# Patient Record
Sex: FEMALE | Race: WHITE | NOT HISPANIC OR LATINO | Employment: OTHER | URBAN - METROPOLITAN AREA
[De-identification: names, ages, dates, MRNs, and addresses within clinical notes are randomized per-mention and may not be internally consistent; named-entity substitution may affect disease eponyms.]

---

## 2017-11-02 ENCOUNTER — HOSPITAL ENCOUNTER (OUTPATIENT)
Dept: LAB | Facility: HOSPITAL | Age: 57
Discharge: HOME | End: 2017-11-02
Attending: INTERNAL MEDICINE | Admitting: INTERNAL MEDICINE

## 2017-11-02 LAB
ALBUMIN SERPL-MCNC: 4 G/DL (ref 3.5–4.8)
ALBUMIN/GLOB SERPL: 1.1 {RATIO} (ref 1–1.7)
ALP SERPL-CCNC: 60 IU/L (ref 32–91)
ALT SERPL-CCNC: 26 IU/L (ref 14–54)
ANION GAP SERPL CALC-SCNC: 9.8 MMOL/L (ref 10–20)
AST SERPL-CCNC: 28 IU/L (ref 15–41)
BASOPHILS # BLD AUTO: 0 10*3/UL (ref 0–0.2)
BASOPHILS NFR BLD AUTO: 1 % (ref 0–2)
BILIRUB SERPL-MCNC: 0.3 MG/DL (ref 0.3–1.2)
BUN SERPL-MCNC: 17 MG/DL (ref 8–20)
BUN/CREAT SERPL: 24.3 (ref 5.4–26.2)
CALCIUM SERPL-MCNC: 9.8 MG/DL (ref 8.9–10.3)
CHLORIDE SERPL-SCNC: 103 MMOL/L (ref 101–111)
CONV CO2: 26 MMOL/L (ref 22–32)
CONV TOTAL PROTEIN: 7.7 G/DL (ref 6.1–7.9)
CREAT UR-MCNC: 0.7 MG/DL (ref 0.4–1)
CRP SERPL-MCNC: <0.02 MG/DL (ref 0–0.7)
DIFFERENTIAL METHOD BLD: (no result)
EOSINOPHIL # BLD AUTO: 0.2 10*3/UL (ref 0–0.3)
EOSINOPHIL # BLD AUTO: 4 % (ref 0–3)
ERYTHROCYTE [DISTWIDTH] IN BLOOD BY AUTOMATED COUNT: 14.8 % (ref 11.5–14.5)
ERYTHROCYTE [SEDIMENTATION RATE] IN BLOOD BY WESTERGREN METHOD: 47 MM/HR (ref 0–30)
GLOBULIN UR ELPH-MCNC: 3.7 G/DL (ref 2.5–3.8)
GLUCOSE SERPL-MCNC: 97 MG/DL (ref 65–99)
HCT VFR BLD AUTO: 32.5 % (ref 35–49)
HGB BLD-MCNC: 10.9 G/DL (ref 12–15)
LYMPHOCYTES # BLD AUTO: 2.4 10*3/UL (ref 0.8–4.8)
LYMPHOCYTES NFR BLD AUTO: 41 % (ref 18–42)
MCH RBC QN AUTO: 30.7 PG (ref 26–32)
MCHC RBC AUTO-ENTMCNC: 33.4 G/DL (ref 32–36)
MCV RBC AUTO: 91.7 FL (ref 80–94)
MONOCYTES # BLD AUTO: 0.6 10*3/UL (ref 0.1–1.3)
MONOCYTES NFR BLD AUTO: 10 % (ref 2–11)
NEUTROPHILS # BLD AUTO: 2.5 10*3/UL (ref 2.3–8.6)
NEUTROPHILS NFR BLD AUTO: 44 % (ref 50–75)
NRBC BLD AUTO-RTO: 0 /100{WBCS}
NRBC/RBC NFR BLD MANUAL: 0 10*3/UL
PLATELET # BLD AUTO: 173 10*3/UL (ref 150–450)
PMV BLD AUTO: 9 FL (ref 7.4–10.4)
POTASSIUM SERPL-SCNC: 3.8 MMOL/L (ref 3.6–5.1)
RBC # BLD AUTO: 3.55 10*6/UL (ref 4–5.4)
SODIUM SERPL-SCNC: 135 MMOL/L (ref 136–144)
TSH SERPL-ACNC: 1.19 UIU/ML (ref 0.34–5.6)
WBC # BLD AUTO: 5.7 10*3/UL (ref 4.5–11.5)

## 2017-11-03 LAB
C3 SERPL-MCNC: 85 MG/DL (ref 79–152)
C4 SERPL-MCNC: 7.6 MG/DL (ref 18–55)

## 2017-11-07 LAB
ANTI-CARDIOLIPIN IGG ANTIBODY: 15 GPL
INR PPP: 1
PTT LA MIX: 37 SEC
SCREEN DRVVT: 35 SEC

## 2017-11-29 ENCOUNTER — HOSPITAL ENCOUNTER (OUTPATIENT)
Dept: LAB | Facility: HOSPITAL | Age: 57
Discharge: HOME | End: 2017-11-29
Attending: INTERNAL MEDICINE | Admitting: INTERNAL MEDICINE

## 2017-11-29 LAB — CK SERPL-CCNC: 113 IU/L (ref 38–234)

## 2018-05-30 ENCOUNTER — HOSPITAL ENCOUNTER (OUTPATIENT)
Dept: LAB | Facility: HOSPITAL | Age: 58
Discharge: HOME | End: 2018-05-30
Attending: INTERNAL MEDICINE | Admitting: INTERNAL MEDICINE

## 2018-05-30 LAB
DAT POLY-SP REAG RBC QL: NEGATIVE
IRON SATN MFR SERPL: 9 % (ref 15–50)
IRON SERPL-MCNC: 50 UG/DL (ref 28–170)
TIBC SERPL-MCNC: 532 UG/DL (ref 228–428)

## 2018-06-01 LAB — LABORATORY COMMENT REPORT: NORMAL

## 2018-06-25 ENCOUNTER — CLINICAL SUPPORT (OUTPATIENT)
Dept: ONCOLOGY | Facility: HOSPITAL | Age: 58
End: 2018-06-25

## 2018-06-25 ENCOUNTER — HOSPITAL ENCOUNTER (OUTPATIENT)
Dept: ONCOLOGY | Facility: HOSPITAL | Age: 58
Discharge: HOME | End: 2018-06-25
Attending: INTERNAL MEDICINE | Admitting: INTERNAL MEDICINE

## 2018-06-25 ENCOUNTER — HOSPITAL ENCOUNTER (OUTPATIENT)
Dept: ONCOLOGY | Facility: CLINIC | Age: 58
Setting detail: INFUSION SERIES
Discharge: HOME | End: 2018-06-25
Attending: INTERNAL MEDICINE | Admitting: INTERNAL MEDICINE

## 2018-06-25 LAB
ERYTHROCYTE [SEDIMENTATION RATE] IN BLOOD BY WESTERGREN METHOD: 49 MM/HR (ref 0–30)
IRON SATN MFR SERPL: 8 % (ref 15–50)
IRON SERPL-MCNC: 41 UG/DL (ref 28–170)
TIBC SERPL-MCNC: 512 UG/DL (ref 228–428)

## 2018-06-25 NOTE — PROGRESS NOTES
PATIENTS ONCOLOGY RECORD LOCATED IN [facility]      Subjective     Name:  LUIZA BERRY     Date:  2018  Address:  [address] [place] [address] [place] [address]  Home: [phone]  :  1960 AGE:  58 y.o.        RECORDS OBTAINED:  Patients Oncology Record is located in [facility]

## 2018-06-27 LAB — HAPTOGLOB SERPL-MCNC: 142 MG/DL (ref 36–195)

## 2018-08-23 ENCOUNTER — HOSPITAL ENCOUNTER (OUTPATIENT)
Dept: ONCOLOGY | Facility: CLINIC | Age: 58
Setting detail: INFUSION SERIES
Discharge: HOME | End: 2018-08-23
Attending: INTERNAL MEDICINE | Admitting: INTERNAL MEDICINE

## 2018-08-23 ENCOUNTER — CLINICAL SUPPORT (OUTPATIENT)
Dept: ONCOLOGY | Facility: HOSPITAL | Age: 58
End: 2018-08-23

## 2018-08-23 NOTE — PROGRESS NOTES
PATIENTS ONCOLOGY RECORD LOCATED IN [facility]      Subjective     Name:  LUIZA BERRY     Date:  2018  Address:  [address] [place] [address] [place] [address]  Home: [phone]  :  1960 AGE:  58 y.o.        RECORDS OBTAINED:  Patients Oncology Record is located in [facility]

## 2018-08-30 ENCOUNTER — CLINICAL SUPPORT (OUTPATIENT)
Dept: ONCOLOGY | Facility: HOSPITAL | Age: 58
End: 2018-08-30

## 2018-08-30 ENCOUNTER — HOSPITAL ENCOUNTER (OUTPATIENT)
Dept: ONCOLOGY | Facility: CLINIC | Age: 58
Setting detail: INFUSION SERIES
Discharge: HOME | End: 2018-08-30
Attending: INTERNAL MEDICINE | Admitting: INTERNAL MEDICINE

## 2018-08-30 NOTE — PROGRESS NOTES
PATIENTS ONCOLOGY RECORD LOCATED IN [facility]      Subjective     Name:  LUIZA BERRY     Date:  2018  Address:  [address] [place] [address] [place] [address]  Home: [phone]  :  1960 AGE:  58 y.o.        RECORDS OBTAINED:  Patients Oncology Record is located in [facility]

## 2019-07-11 ENCOUNTER — LAB (OUTPATIENT)
Dept: LAB | Facility: HOSPITAL | Age: 59
End: 2019-07-11

## 2019-07-11 ENCOUNTER — OFFICE VISIT (OUTPATIENT)
Dept: RHEUMATOLOGY | Facility: CLINIC | Age: 59
End: 2019-07-11

## 2019-07-11 VITALS
HEIGHT: 66 IN | DIASTOLIC BLOOD PRESSURE: 85 MMHG | SYSTOLIC BLOOD PRESSURE: 134 MMHG | HEART RATE: 75 BPM | BODY MASS INDEX: 27.16 KG/M2 | WEIGHT: 169 LBS

## 2019-07-11 DIAGNOSIS — R76.8 ELEVATED ANTINUCLEAR ANTIBODY (ANA) LEVEL: Primary | ICD-10-CM

## 2019-07-11 DIAGNOSIS — M79.7 PRIMARY FIBROMYALGIA SYNDROME: ICD-10-CM

## 2019-07-11 DIAGNOSIS — R76.8 ELEVATED ANTINUCLEAR ANTIBODY (ANA) LEVEL: ICD-10-CM

## 2019-07-11 DIAGNOSIS — R70.0 ESR RAISED: ICD-10-CM

## 2019-07-11 LAB
ALBUMIN SERPL-MCNC: 4 G/DL (ref 3.5–4.8)
ALBUMIN/GLOB SERPL: 0.9 G/DL (ref 1–1.7)
ALP SERPL-CCNC: 76 U/L (ref 32–91)
ALT SERPL W P-5'-P-CCNC: 29 U/L (ref 14–54)
ANION GAP SERPL CALCULATED.3IONS-SCNC: 12.7 MMOL/L (ref 5–15)
AST SERPL-CCNC: 30 U/L (ref 15–41)
BASOPHILS # BLD AUTO: 0 10*3/MM3 (ref 0–0.2)
BASOPHILS NFR BLD AUTO: 0.5 % (ref 0–1.5)
BILIRUB SERPL-MCNC: 0.5 MG/DL (ref 0.3–1.2)
BILIRUB UR QL STRIP: NEGATIVE
BUN BLD-MCNC: 12 MG/DL (ref 8–20)
BUN/CREAT SERPL: 13.3 (ref 5.4–26.2)
CALCIUM SPEC-SCNC: 9.6 MG/DL (ref 8.9–10.3)
CHLORIDE SERPL-SCNC: 105 MMOL/L (ref 101–111)
CLARITY UR: CLEAR
CO2 SERPL-SCNC: 23 MMOL/L (ref 22–32)
COLOR UR: YELLOW
CREAT BLD-MCNC: 0.9 MG/DL (ref 0.4–1)
CRP SERPL-MCNC: 0.1 MG/DL (ref 0–0.7)
DEPRECATED RDW RBC AUTO: 46.4 FL (ref 37–54)
EOSINOPHIL # BLD AUTO: 0.2 10*3/MM3 (ref 0–0.4)
EOSINOPHIL NFR BLD AUTO: 2.9 % (ref 0.3–6.2)
ERYTHROCYTE [DISTWIDTH] IN BLOOD BY AUTOMATED COUNT: 12.9 % (ref 12.3–15.4)
ERYTHROCYTE [SEDIMENTATION RATE] IN BLOOD: 53 MM/HR (ref 0–30)
GFR SERPL CREATININE-BSD FRML MDRD: 64 ML/MIN/1.73
GLOBULIN UR ELPH-MCNC: 4.5 GM/DL (ref 2.5–3.8)
GLUCOSE BLD-MCNC: 86 MG/DL (ref 65–99)
GLUCOSE UR STRIP-MCNC: NEGATIVE MG/DL
HCT VFR BLD AUTO: 36.3 % (ref 34–46.6)
HGB BLD-MCNC: 12.3 G/DL (ref 12–15.9)
HGB UR QL STRIP.AUTO: NEGATIVE
KETONES UR QL STRIP: NEGATIVE
LEUKOCYTE ESTERASE UR QL STRIP.AUTO: NEGATIVE
LYMPHOCYTES # BLD AUTO: 2.7 10*3/MM3 (ref 0.7–3.1)
LYMPHOCYTES NFR BLD AUTO: 34.5 % (ref 19.6–45.3)
MCH RBC QN AUTO: 34.7 PG (ref 26.6–33)
MCHC RBC AUTO-ENTMCNC: 34 G/DL (ref 31.5–35.7)
MCV RBC AUTO: 102 FL (ref 79–97)
MONOCYTES # BLD AUTO: 0.8 10*3/MM3 (ref 0.1–0.9)
MONOCYTES NFR BLD AUTO: 10 % (ref 5–12)
NEUTROPHILS # BLD AUTO: 4 10*3/MM3 (ref 1.7–7)
NEUTROPHILS NFR BLD AUTO: 52.1 % (ref 42.7–76)
NITRITE UR QL STRIP: NEGATIVE
PH UR STRIP.AUTO: 5.5 [PH] (ref 5–8)
PLATELET # BLD AUTO: 235 10*3/MM3 (ref 140–450)
PMV BLD AUTO: 8.6 FL (ref 6–12)
POTASSIUM BLD-SCNC: 4.7 MMOL/L (ref 3.6–5.1)
PROT SERPL-MCNC: 8.5 G/DL (ref 6.1–7.9)
PROT UR QL STRIP: NEGATIVE
RBC # BLD AUTO: 3.56 10*6/MM3 (ref 3.77–5.28)
SODIUM BLD-SCNC: 136 MMOL/L (ref 136–144)
SP GR UR STRIP: 1.01 (ref 1–1.03)
UROBILINOGEN UR QL STRIP: NORMAL
WBC NRBC COR # BLD: 7.7 10*3/MM3 (ref 3.4–10.8)

## 2019-07-11 PROCEDURE — 80053 COMPREHEN METABOLIC PANEL: CPT | Performed by: INTERNAL MEDICINE

## 2019-07-11 PROCEDURE — 81003 URINALYSIS AUTO W/O SCOPE: CPT | Performed by: INTERNAL MEDICINE

## 2019-07-11 PROCEDURE — 86140 C-REACTIVE PROTEIN: CPT | Performed by: INTERNAL MEDICINE

## 2019-07-11 PROCEDURE — 85027 COMPLETE CBC AUTOMATED: CPT | Performed by: INTERNAL MEDICINE

## 2019-07-11 PROCEDURE — 85652 RBC SED RATE AUTOMATED: CPT | Performed by: INTERNAL MEDICINE

## 2019-07-11 PROCEDURE — 99214 OFFICE O/P EST MOD 30 MIN: CPT | Performed by: INTERNAL MEDICINE

## 2019-07-11 RX ORDER — FERROUS SULFATE 325(65) MG
1 TABLET ORAL DAILY
Refills: 1 | COMMUNITY
Start: 2019-06-22

## 2019-07-11 RX ORDER — CITALOPRAM 40 MG/1
TABLET ORAL EVERY 24 HOURS
COMMUNITY
Start: 2017-11-01

## 2019-07-11 RX ORDER — NICOTINE POLACRILEX 4 MG/1
GUM, CHEWING ORAL EVERY 24 HOURS
COMMUNITY
Start: 2017-11-01

## 2019-07-11 RX ORDER — SPIRONOLACTONE 25 MG/1
25 TABLET ORAL DAILY
Refills: 1 | COMMUNITY
Start: 2019-06-08

## 2019-07-11 RX ORDER — APIXABAN 5 MG/1
5 TABLET, FILM COATED ORAL 2 TIMES DAILY
Refills: 2 | COMMUNITY
Start: 2019-06-14

## 2019-07-11 RX ORDER — GABAPENTIN 300 MG/1
300 CAPSULE ORAL DAILY
Refills: 1 | COMMUNITY
Start: 2019-05-24

## 2019-07-11 RX ORDER — METOPROLOL TARTRATE 50 MG/1
TABLET, FILM COATED ORAL
Refills: 1 | COMMUNITY
Start: 2019-06-14

## 2019-07-11 RX ORDER — MELOXICAM 15 MG/1
TABLET ORAL EVERY 24 HOURS
COMMUNITY
Start: 2017-11-01

## 2019-07-11 NOTE — PROGRESS NOTES
The patient is a 59-year-old female who comes today in follow-up.  She is known to have positive DONOVAN, low C4 without other features for connective tissue disease to have a definite diagnosis.     Since last time I saw her, she was admitted to the hospital due to sepsis secondary to Haemophilus influenza in the setting of pharyngitis/epiglottitis.  Her hospitalization was complicated with A. Fib and hypertension. Hospital course reviewed, the patient presented to [facility] 5/10/2019, the patient during the hospital course had x-rays which showed small bilateral pleural effusion adjacent to the airspace disease and it was felt that it might have been secondary to acute diastolic CHF from tachycardia.  Echocardiogram showed an ejection fraction of 65%, shortness of breath significantly improved after diuretics.       After her discharge, the patient refers feeling still weak, drained.  About two weeks after her discharge, she developed diarrhea and vomiting, claims that other family member was also suffering from similar symptoms. She also had the erythematous rash that she has had for several years intermittently for several years which she treats with a Medrol Dosepak when present, she has this treatment at least 2 times a year and it resolves completely without any other intervention.  She shows me a picture.  Denies oral or nasal ulcers, denies malar rash or excessive hair loss, no hair thinning.  Denies abdominal pain, nausea, vomiting, diarrhea constipation, no dysuria microscopic hematuria, denies skin rash at today's visit of swollen glands.  All other systems were reviewed and they were negative.    The patient is also known to suffer from fibromyalgia syndrome and takes gabapentin regularly, she feels horrible if she does not take this medication.    Laboratories were reviewed which were done 7/10/2019 which showed normal white cell count, hemoglobin, normal platelet count, normal  absolute lymphocyte count.  ESR is 52 ((she is known to have abnormal ESR), creatinine 0.64, GFR more than 60, calcium total 10.8 (10.5), CRP 0.5, UA negative for protein blood or casts    Physical exam     \  Vitals:    07/11/19 0957   BP: 134/85   Pulse: 75   General: Alert, oriented to very cooperative physical exam.  In acute distress.  Head, normocephalic, atraumatic.  Eyes: PERRLA, EOMI.  Oropharynx: Moist mucosa, no oral lesions, no ulcers.  Nose: No lesions.  Cardiac: S1, S2, regular regular rhythm, no murmur gallops or rubs.  Lungs: Clear to auscultation bilaterally.  MSK.  No major joint tenderness, no signs of synovitis, preserved range of motion.  Skin: Normal, no abnormalities noted.           Assessment:    1. patient with known positive DONOVAN, low C4, had low platelets but never below 100,000.  Today a picture of erythematous rash was seen, seems to be vasculitic in nature but really nothing definite, had never had biopsy. Always resolves with Medrol dose pack.  I reviewed laboratories done recently which were negative for thrombocytopenia, no anemia, no lymphopenia, no leukopenia.  No major complaints today except for low stamina.  Physical exam benign.  No further intervention for now.    2.  Hypercalcemia.  New to me.   Follow-up with primary care doctor.    3.  Fibromyalgia syndrome.  Follow-up with PCP    A total 30 minutes were spent with the patient.  Greater than 50% of the time was counseling the patient about the diagnosis, the treatment, coordinating care, reviewing symptoms and symptoms management.      Plan:  Review laboratories AVISE, patient to contact the office to discuss results  Follow-up with PCP regarding hypercalcemia  Continue  Management of FMS with PCP  Recommend regular exercises  Keep up-to-date with age-appropriate cancer screening  Keep up-to-date with vaccinations  RTC 6 months or sooner if needed.

## 2019-08-09 ENCOUNTER — TELEPHONE (OUTPATIENT)
Dept: RHEUMATOLOGY | Facility: CLINIC | Age: 59
End: 2019-08-09

## 2019-08-29 ENCOUNTER — TELEPHONE (OUTPATIENT)
Dept: RHEUMATOLOGY | Facility: CLINIC | Age: 59
End: 2019-08-29

## 2019-08-29 NOTE — TELEPHONE ENCOUNTER
Pt stated that she was only taking one iron pill a day and she thinks its supposed to be 2 but no record of this pt will continue with only one a day

## 2019-11-13 RX ORDER — FERROUS SULFATE 325(65) MG
TABLET ORAL
Qty: 180 TABLET | Refills: 1 | OUTPATIENT
Start: 2019-11-13

## 2019-11-14 ENCOUNTER — TELEPHONE (OUTPATIENT)
Dept: RHEUMATOLOGY | Facility: CLINIC | Age: 59
End: 2019-11-14

## 2019-11-14 DIAGNOSIS — R76.8 ELEVATED ANTINUCLEAR ANTIBODY (ANA) LEVEL: ICD-10-CM

## 2019-11-14 DIAGNOSIS — M79.7 PRIMARY FIBROMYALGIA SYNDROME: Primary | ICD-10-CM

## 2019-11-14 NOTE — TELEPHONE ENCOUNTER
Pt has been taking Iron pills 2 a day. The rx was filled by you in the past in [facility]

## 2019-11-18 NOTE — TELEPHONE ENCOUNTER
No need to renew.  Please ask her to have some basic laboratories prior to her follow-up appointment which will be in January.

## 2020-01-21 ENCOUNTER — OFFICE VISIT (OUTPATIENT)
Dept: RHEUMATOLOGY | Facility: CLINIC | Age: 60
End: 2020-01-21

## 2020-01-21 VITALS
BODY MASS INDEX: 27.97 KG/M2 | HEIGHT: 66 IN | WEIGHT: 174 LBS | SYSTOLIC BLOOD PRESSURE: 133 MMHG | HEART RATE: 57 BPM | DIASTOLIC BLOOD PRESSURE: 76 MMHG

## 2020-01-21 DIAGNOSIS — R76.8 ANA POSITIVE: Primary | ICD-10-CM

## 2020-01-21 DIAGNOSIS — M79.7 FIBROMYALGIA: ICD-10-CM

## 2020-01-21 DIAGNOSIS — E83.52 HIGH CALCIUM LEVELS: ICD-10-CM

## 2020-01-21 DIAGNOSIS — R70.0 ESR RAISED: ICD-10-CM

## 2020-01-21 PROCEDURE — 99214 OFFICE O/P EST MOD 30 MIN: CPT | Performed by: INTERNAL MEDICINE

## 2020-01-21 NOTE — PROGRESS NOTES
HPI:  The patient is a 59-year-old female who comes today in follow-up.  She is followed in the rheumatology office due to a positive DONOVAN and low C4.  So far she has not been found to have enough features to support a definite diagnosis of an autoimmune connective tissue disease.  She was seen in the office 7/11/2019, that time, the patient was noted to have hypercalcemia, she was recommended to be seen by her primary care doctor, unfortunately patient did not follow the recommendation.    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  Today the patient denies occasional joint pain is rated 1 out of 10, no morning stiffness, no joint swelling denies fever or chills, no chest pain shortness of breath, no cough, no skin lesions, all other systems reviewed and they were negative.    Laboratories done for this visit dated 1/6/2020 show a ESR of 0.5, ESR 34, total count, white cell count lymphocyte count within normal limits.  She continues to have elevated calcium at 11.3, her liver function test and kidney function is within normal limits.  AVISE CTD SLE negative    Social and family history reviewed and unchanged.    Rheumatology history:  1. DONOVAN (+):  160,  low C4, intermittent cutaneous lesions nonspecific(evaluated by Allergy an ID ).; (9/18) NO ANEMIA, NO THROMBOCYTOPENIA below [id number], NO LYMPHOPENIA; C4 low at 7; no symptoms        2. FMS      Gabapentin 300mg in the AM     3. lower extremity weakness:  EMG/NCT bilateral lower extremities normal 12/2017  Normal muscle enzymes         Past Medical History:   Diagnosis Date   • Arthritis    • Depression    • HTN (hypertension)    • Joint pain        Current Outpatient Medications   Medication Sig Dispense Refill   • citalopram (CeleXA) 40 MG tablet Daily.     • ELIQUIS 5 MG tablet tablet Take 5 mg by mouth 2 (Two) Times a Day.  2   •  ferrous sulfate 325 (65 FE) MG tablet Take 1 tablet by mouth Daily.  1   • gabapentin (NEURONTIN) 300 MG capsule Take 300 mg by mouth Daily.  1   • meloxicam (MOBIC) 15 MG tablet Daily.     • metoprolol tartrate (LOPRESSOR) 50 MG tablet TAKE 1 TABLET BY MOUTH TWICE A DAY FOR 30 DAYS  1   • Omeprazole ([employer] OMEPRAZOLE) 20 MG tablet delayed-release Daily.     • spironolactone (ALDACTONE) 25 MG tablet Take 25 mg by mouth Daily.  1     No current facility-administered medications for this visit.        Physical exam:    Vitals:    01/21/20 0815   BP: 133/76   Pulse: 57        GENERAL: Well-developed, well-nourished in no acute distress. Alert and oriented x3.  HEENT: Normocephalic, atraumatic. Pupils are equal, round, and reactive to light. Extraocular muscles are intact. Mucous membranes are pink and moist. Nostrils are clear.   NECK: Supple without lymphadenopathy.  LUNGS: Clear to auscultation bilaterally.  HEART: Regular rate and rhythm without murmur, rub or gallop.  CHEST: Respirations easy and unlabored.  EXTREMITIES: No cyanosis, edema or clubbing.  SKIN: Warm, dry and intact.  MSK: No joint tenderness, no synovitis.    Assessment:  1.  DONOVAN positive.  Unchanged.  Laboratories reviewed.  Today there are no features to support a definite diagnosis of an autoimmune connective tissue disease.    2.  Raised ESR.  Not significant at this time.  Overall her physical exam is for the most part benign except for elevated calcium.  Will monitor.    3.  Elevated calcium levels.  She was recommended again to have a follow-up with her primary care doctor regarding this issue.    4.  Fibromyalgia syndrome.  Continue to follow with PCP.  Recommend meditation and relaxation techniques.  Daily low impact exercises.    Plan:  RTC in a year, sooner if needed       Cancer Screening:    Colonscopy: 2019    PAP:2019   Mammogram:2019  Bone Health:      Calcium and Vitamin D:  YES     DEXA:  NO  Vaccines:   FLU:2019      PNUEMONIA:(13)  5/28/2019       Zoster: 1/6/20    10/4/2019   X-rays of chest, Hands, Feet: Knee and Left Foot few months ago  Hepatitis panel, HIV, QTB/PPD: NO      Orders Placed This Encounter   Procedures   • Comprehensive Metabolic Panel     Standing Status:   Future   • C-reactive Protein     Standing Status:   Future   • Sedimentation Rate     Standing Status:   Future   • C3 Complement     Standing Status:   Future     Standing Expiration Date:   1/21/2021   • C4 Complement     Standing Status:   Future     Standing Expiration Date:   1/21/2021   • CBC With Manual Differential     Standing Status:   Future

## 2020-03-18 RX ORDER — FERROUS SULFATE 325(65) MG
TABLET ORAL
Qty: 90 TABLET | Refills: 3 | OUTPATIENT
Start: 2020-03-18

## 2023-07-14 ENCOUNTER — TELEPHONE (OUTPATIENT)
Dept: ONCOLOGY | Facility: CLINIC | Age: 63
End: 2023-07-14

## 2023-07-14 NOTE — TELEPHONE ENCOUNTER
Caller: Tae Henao    Relationship: Self    Best call back number: [phone]    What is the best time to reach you: ANYTIME    Who are you requesting to speak with (clinical staff, provider, specific staff member): DR CORDOVA    What was the call regarding: PATIENT STATED THAT HER RESULTS ARE IN HER CHART CONFIRMING THAT SHE HAS MULTIPLE MYELOMA. SHE WANTED TO MOVE FORWARD WITH THE NEXT STEPS WHICH SHE THOUGHT WAS SCHEDULING A BONE MARROW BIOPSY. PLEASE CALL TO ADVISE. .

## 2023-07-24 DIAGNOSIS — D47.2 MGUS (MONOCLONAL GAMMOPATHY OF UNKNOWN SIGNIFICANCE): Primary | ICD-10-CM

## 2023-07-25 ENCOUNTER — TELEPHONE (OUTPATIENT)
Dept: ONCOLOGY | Facility: CLINIC | Age: 63
End: 2023-07-25
Payer: COMMERCIAL

## 2023-07-25 DIAGNOSIS — D47.2 MGUS (MONOCLONAL GAMMOPATHY OF UNKNOWN SIGNIFICANCE): Primary | ICD-10-CM

## 2023-07-25 DIAGNOSIS — C90.00 MULTIPLE MYELOMA NOT HAVING ACHIEVED REMISSION: Primary | ICD-10-CM

## 2023-07-25 RX ORDER — OXYCODONE HYDROCHLORIDE AND ACETAMINOPHEN 5; 325 MG/1; MG/1
1 TABLET ORAL EVERY 4 HOURS PRN
Qty: 180 TABLET | Refills: 0 | Status: SHIPPED | OUTPATIENT
Start: 2023-07-25

## 2023-07-25 RX ORDER — OXYCODONE HYDROCHLORIDE AND ACETAMINOPHEN 5; 325 MG/1; MG/1
1 TABLET ORAL EVERY 4 HOURS PRN
Qty: 180 TABLET | Refills: 0 | Status: CANCELLED | OUTPATIENT
Start: 2023-07-25

## 2023-07-25 NOTE — TELEPHONE ENCOUNTER
Caller: Juliano Luiza J    Relationship: Self    Best call back number: [phone]    What was the call regarding: LUIZA CALLED REGARDING HER BONE SCAN. SHE IS WANTING TO MAKE SURE IT WILL BE SCHEDULED ON 07/31. SHE ALSO SAYS THAT SHE IS NEEDING SOMETHING STRONGER FOR PAIN. HER TRAMADOL IS NOT HELPING. SHE HAS ALSO TRIED HYDROCODONE, BUT THAT IS NOT HELPING EITHER. PLEASE CALL TO DISCUSS.

## 2023-07-27 ENCOUNTER — DOCUMENTATION (OUTPATIENT)
Dept: ONCOLOGY | Facility: CLINIC | Age: 63
End: 2023-07-27
Payer: COMMERCIAL

## 2023-07-27 ENCOUNTER — TELEPHONE (OUTPATIENT)
Dept: ONCOLOGY | Facility: CLINIC | Age: 63
End: 2023-07-27
Payer: COMMERCIAL

## 2023-07-27 NOTE — TELEPHONE ENCOUNTER
Caller: DENISE    Relationship: [facility]    Best call back number: [phone]     Who are you requesting to speak with (clinical staff, provider,  specific staff member): PRIOR AUTHORIZATION    What was the call regarding: PRIOR AUTHORIZATION FOR PET CT HAS BEEN DENIED DUE TO RESULTS OF PREVIOUS TESTS UNCLEAR/ PREVIOUS TESTS NOT COMPLETED.    PEER TO PEER SCHEDULING PLEASE CALL #[phone]    PLEASE RETURN CALL NO LATER THAN 2PM TODAY IF APPROVED THROUGH PEER TO PEER OR IF RESCHEDULE NEEDED.

## 2023-07-27 NOTE — PROGRESS NOTES
[id number] performed with physician reviewer at [facility].  Additional information supplied included results of her skeletal survey showing lytic lesions, results of her SPEP/LATA/immunoglobulins/free light chains/U LATA/urine free light chains.  Additional information given written regards to her clinical symptoms of arthralgias and fatigue, and skin rash.  After discussion her request for PET/CT has been approved.  This will be good at [facility] from 7/25/2023 through 8/23/2023.  Authorization #[id number].

## 2023-07-28 ENCOUNTER — APPOINTMENT (OUTPATIENT)
Dept: LAB | Facility: HOSPITAL | Age: 63
End: 2023-07-28
Payer: COMMERCIAL

## 2023-07-28 ENCOUNTER — OFFICE VISIT (OUTPATIENT)
Dept: ONCOLOGY | Facility: CLINIC | Age: 63
End: 2023-07-28
Payer: COMMERCIAL

## 2023-07-28 ENCOUNTER — HOSPITAL ENCOUNTER (OUTPATIENT)
Dept: CARDIOLOGY | Facility: HOSPITAL | Age: 63
Discharge: HOME | End: 2023-07-28
Payer: COMMERCIAL

## 2023-07-28 ENCOUNTER — HOSPITAL ENCOUNTER (OUTPATIENT)
Dept: PET IMAGING | Facility: HOSPITAL | Age: 63
Discharge: HOME | End: 2023-07-28
Payer: COMMERCIAL

## 2023-07-28 VITALS
TEMPERATURE: 98.4 F | OXYGEN SATURATION: 98 % | WEIGHT: 174 LBS | RESPIRATION RATE: 18 BRPM | DIASTOLIC BLOOD PRESSURE: 78 MMHG | SYSTOLIC BLOOD PRESSURE: 143 MMHG | HEART RATE: 68 BPM | HEIGHT: 65 IN | BODY MASS INDEX: 28.99 KG/M2

## 2023-07-28 DIAGNOSIS — C90.00 MULTIPLE MYELOMA NOT HAVING ACHIEVED REMISSION: ICD-10-CM

## 2023-07-28 DIAGNOSIS — R60.0 LOCALIZED EDEMA: ICD-10-CM

## 2023-07-28 DIAGNOSIS — R76.8 ELEVATED ANTINUCLEAR ANTIBODY (ANA) LEVEL: Primary | ICD-10-CM

## 2023-07-28 DIAGNOSIS — D47.2 MGUS (MONOCLONAL GAMMOPATHY OF UNKNOWN SIGNIFICANCE): ICD-10-CM

## 2023-07-28 DIAGNOSIS — M71.22 BAKER'S CYST OF KNEE, LEFT: Primary | ICD-10-CM

## 2023-07-28 LAB
BH CV LOWER VASCULAR LEFT COMMON FEMORAL AUGMENT: NORMAL
BH CV LOWER VASCULAR LEFT COMMON FEMORAL COMPETENT: NORMAL
BH CV LOWER VASCULAR LEFT COMMON FEMORAL COMPRESS: NORMAL
BH CV LOWER VASCULAR LEFT COMMON FEMORAL PHASIC: NORMAL
BH CV LOWER VASCULAR LEFT COMMON FEMORAL SPONT: NORMAL
BH CV LOWER VASCULAR LEFT DISTAL FEMORAL COMPRESS: NORMAL
BH CV LOWER VASCULAR LEFT GASTRONEMIUS COMPRESS: NORMAL
BH CV LOWER VASCULAR LEFT GREATER SAPH AK COMPRESS: NORMAL
BH CV LOWER VASCULAR LEFT GREATER SAPH BK COMPRESS: NORMAL
BH CV LOWER VASCULAR LEFT LESSER SAPH COMPRESS: NORMAL
BH CV LOWER VASCULAR LEFT MID FEMORAL AUGMENT: NORMAL
BH CV LOWER VASCULAR LEFT MID FEMORAL COMPETENT: NORMAL
BH CV LOWER VASCULAR LEFT MID FEMORAL COMPRESS: NORMAL
BH CV LOWER VASCULAR LEFT MID FEMORAL PHASIC: NORMAL
BH CV LOWER VASCULAR LEFT MID FEMORAL SPONT: NORMAL
BH CV LOWER VASCULAR LEFT PERONEAL COMPRESS: NORMAL
BH CV LOWER VASCULAR LEFT POPLITEAL AUGMENT: NORMAL
BH CV LOWER VASCULAR LEFT POPLITEAL COMPETENT: NORMAL
BH CV LOWER VASCULAR LEFT POPLITEAL COMPRESS: NORMAL
BH CV LOWER VASCULAR LEFT POPLITEAL PHASIC: NORMAL
BH CV LOWER VASCULAR LEFT POPLITEAL SPONT: NORMAL
BH CV LOWER VASCULAR LEFT POSTERIOR TIBIAL COMPRESS: NORMAL
BH CV LOWER VASCULAR LEFT PROXIMAL FEMORAL COMPRESS: NORMAL
BH CV LOWER VASCULAR LEFT SAPHENOFEMORAL JUNCTION COMPRESS: NORMAL
BH CV LOWER VASCULAR RIGHT COMMON FEMORAL AUGMENT: NORMAL
BH CV LOWER VASCULAR RIGHT COMMON FEMORAL COMPETENT: NORMAL
BH CV LOWER VASCULAR RIGHT COMMON FEMORAL COMPRESS: NORMAL
BH CV LOWER VASCULAR RIGHT COMMON FEMORAL PHASIC: NORMAL
BH CV LOWER VASCULAR RIGHT COMMON FEMORAL SPONT: NORMAL
BH CV POP FLUID COLLECT LEFT: 1
BH CV VAS PRELIMINARY FINDINGS SCRIPTING: 1
GLUCOSE BLDC GLUCOMTR-MCNC: 99 MG/DL (ref 70–105)
HOLD SPECIMEN: NORMAL

## 2023-07-28 PROCEDURE — 93971 EXTREMITY STUDY: CPT

## 2023-07-28 PROCEDURE — 36415 COLL VENOUS BLD VENIPUNCTURE: CPT

## 2023-07-28 PROCEDURE — 82948 REAGENT STRIP/BLOOD GLUCOSE: CPT

## 2023-07-28 PROCEDURE — 0 FLUDEOXYGLUCOSE F18 SOLUTION: Performed by: INTERNAL MEDICINE

## 2023-07-28 PROCEDURE — 78815 PET IMAGE W/CT SKULL-THIGH: CPT

## 2023-07-28 PROCEDURE — A9552 F18 FDG: HCPCS | Performed by: INTERNAL MEDICINE

## 2023-07-28 RX ADMIN — FLUDEOXYGLUCOSE F18 1 DOSE: 300 INJECTION INTRAVENOUS at 09:40

## 2023-07-28 NOTE — PROGRESS NOTES
HEMATOLOGY ONCOLOGY OUTPATIENT CONSULTATION       Patient name: Tae Henao  : 1960  MRN: [id number]  Primary Care Physician: Reilly Ruano APRN  Referring Physician: Reilly Ruano APRN  Reason For Consult:       History of Present Illness:  Patient is a 63 y.o. 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    Patient has had recurrent urinary tract infections since 2023.  Patient has had cystoscopy which was unremarkable  She also complains of rash along with flulike symptoms since [facility].  Rash is predominantly on her legs and was on her face look like urticaria.  She has a photo of the rash which has now improved significantly.  She also been having pain in her chest back.  She was referred to dermatology with this rashes and her DONOVAN was positive  She was prescribed prednisone and the rash improved.  She had a punch biopsy of the rash which showed mild epidermal spongiosis with perivascular interstitial lymphohistiocytic infiltrate  As a part of the work-up she had a SPEP LATA which showed monoclonal gammopathy was referred for plasma cell dyscrasia  Was discussed by her dermatologist at certain rashes can be seen with monoclonal gammopathy such as Schnitzler syndrome    Subjective:  Julieta is here today for an acute visit due to new onset left lower extremity pain and swelling.  She is accompanied at the visit by her cousin.  She reports that she has had swelling in her left leg that feels tight and painful.  There has been no known injury.  At her baseline she does have widespread bone pain that causes her difficulty with ambulating and she is using a wheelchair at the appointment today.      Past Medical History:   Diagnosis Date    Arthritis     Depression     HTN (hypertension)     Joint pain        Past Surgical History:   Procedure Laterality Date     SECTION      GASTRIC BYPASS           Current Outpatient Medications:     aspirin 81 MG EC  tablet, Take 1 tablet by mouth Daily., Disp: , Rfl:     citalopram (CeleXA) 40 MG tablet, Daily., Disp: , Rfl:     ferrous sulfate 325 (65 FE) MG tablet, Take 1 tablet by mouth Daily., Disp: , Rfl: 1    gabapentin (NEURONTIN) 300 MG capsule, Take 1 capsule by mouth Daily., Disp: , Rfl: 1    meloxicam (MOBIC) 15 MG tablet, Daily., Disp: , Rfl:     Methenamine-Sodium Salicylate (CYSTEX PO), Take  by mouth., Disp: , Rfl:     multivitamin with minerals tablet tablet, Take 1 tablet by mouth Daily., Disp: , Rfl:     Omeprazole 20 MG tablet delayed-release, Daily., Disp: , Rfl:     oxyCODONE-acetaminophen (PERCOCET) 5-325 MG per tablet, Take 1 tablet by mouth Every 4 (Four) Hours As Needed for Moderate Pain., Disp: 180 tablet, Rfl: 0    Probiotic Product (FORTIFY DAILY PROBIOTIC PO), Take  by mouth., Disp: , Rfl:     spironolactone (ALDACTONE) 25 MG tablet, Take 1 tablet by mouth Daily., Disp: , Rfl: 1    traMADol (ULTRAM) 50 MG tablet, Take 1 tablet by mouth Every Night., Disp: 30 tablet, Rfl: 0    metoprolol tartrate (LOPRESSOR) 50 MG tablet, TAKE 1 TABLET BY MOUTH TWICE A DAY FOR 30 DAYS (Patient not taking: Reported on 7/28/2023), Disp: , Rfl: 1  No current facility-administered medications for this visit.    Allergies   Allergen Reactions    Morphine Unknown (See Comments)    Azithromycin Unknown (See Comments)    Penicillins Unknown (See Comments)       Family History   Problem Relation Age of Onset    Arthritis Mother     Diabetes Mother     Hypertension Mother     Hypertension Father     Heart disease Father     Arthritis Father     Cancer Sister     Stroke Sister        Cancer-related family history includes Cancer in her sister.      Social History     Tobacco Use    Smoking status: Former     Types: Cigarettes    Smokeless tobacco: Never   Substance Use Topics    Alcohol use: Yes    Drug use: No     Social History     Social History Narrative    Not on file       ROS:   Review of Systems   Constitutional:   "Positive for fatigue. Negative for fever.   HENT:  Negative for congestion and nosebleeds.    Eyes:  Negative for pain.   Respiratory:  Negative for cough and shortness of breath.    Cardiovascular:  Negative for chest pain.   Gastrointestinal:  Negative for abdominal pain, blood in stool, diarrhea, nausea and vomiting.   Endocrine: Negative for cold intolerance and heat intolerance.   Genitourinary:  Negative for difficulty urinating.   Musculoskeletal:  Positive for arthralgias and myalgias.   Skin:  Negative for rash.   Neurological:  Positive for weakness. Negative for dizziness and headaches.   Hematological:  Does not bruise/bleed easily.   Psychiatric/Behavioral:  Negative for behavioral problems.        Objective:    Vital Signs:  Vitals:    07/28/23 1120   BP: 143/78   Pulse: 68   Resp: 18   Temp: 98.4 °F (36.9 °C)   SpO2: 98%   Weight: 78.9 kg (174 lb)   Height: 163.8 cm (64.5\")   PainSc:   6   PainLoc: Generalized       Body mass index is 29.41 kg/m².    ECOG  (0) Fully active, able to carry on all predisease performance without restriction    Physical Exam:   Physical Exam  Constitutional:       Appearance: Normal appearance.   HENT:      Head: Normocephalic and atraumatic.   Eyes:      Pupils: Pupils are equal, round, and reactive to light.   Cardiovascular:      Rate and Rhythm: Normal rate and regular rhythm.      Pulses: Normal pulses.      Heart sounds: No murmur heard.  Pulmonary:      Effort: Pulmonary effort is normal.      Breath sounds: Normal breath sounds.   Abdominal:      General: There is no distension.      Palpations: Abdomen is soft. There is no mass.      Tenderness: There is no abdominal tenderness.   Musculoskeletal:         General: Normal range of motion.      Cervical back: Normal range of motion.      Left lower leg: Edema (Nonpitting) present.      Comments: Nonpitting edema to the left lower extremity from foot to below the knee.  No redness.   Skin:     General: Skin is warm. "   Neurological:      General: No focal deficit present.      Mental Status: She is alert.   Psychiatric:         Mood and Affect: Mood normal.       Lab Results - Last 18 Months   Lab Units 07/28/23  1113 07/11/23  1439   WBC 10*3/mm3 6.93 8.79   HEMOGLOBIN g/dL 11.2* 12.0   HEMATOCRIT % 35.0 38.0   PLATELETS 10*3/mm3 235 270   MCV fL 101.7* 104.7*     Lab Results - Last 18 Months   Lab Units 07/11/23  1439   SODIUM mmol/L 133*   POTASSIUM mmol/L 4.9   CHLORIDE mmol/L 100   CO2 mmol/L 26.0   BUN mg/dL 13   CREATININE mg/dL 0.78   CALCIUM mg/dL 9.9   BILIRUBIN mg/dL 0.3   ALK PHOS U/L 120*   ALT (SGPT) U/L 27   AST (SGOT) U/L 25   GLUCOSE mg/dL 83       Lab Results   Component Value Date    GLUCOSE 83 07/11/2023    BUN 13 07/11/2023    CREATININE 0.78 07/11/2023    EGFRIFNONA 64 07/11/2019    BCR 16.7 07/11/2023    K 4.9 07/11/2023    CO2 26.0 07/11/2023    CALCIUM 9.9 07/11/2023    PROTENTOTREF 8.3 07/11/2023    ALBUMIN 4.3 07/11/2023    ALBUMIN 3.8 07/11/2023    LABIL2 0.9 07/11/2023    AST 25 07/11/2023    ALT 27 07/11/2023       No results for input(s): APTT, INR, PTT in the last 72719 hours.    Lab Results   Component Value Date    IRON 41 06/25/2018    TIBC 512 (H) 06/25/2018       No results found for: FOLATE    No results found for: OCCULTBLD    No results found for: RETICCTPCT  No results found for: WRCGMDZP84  No results found for: SPEP, UPEP  No results found for: LDH, URICACID  Lab Results   Component Value Date    SEDRATE 53 (H) 07/11/2019     Lab Results   Component Value Date    HAPTOGLOBIN 142 06/25/2018     Lab Results   Component Value Date    INR 1.0 11/02/2017     No results found for:   No results found for: CEA  No components found for: CA-19-9  No results found for: PSA  Lab Results   Component Value Date    SEDRATE 53 (H) 07/11/2019          Assessment & Plan     Patient is a 63-year-old female with intermittent urticarial rash, arthralgias findings concerning for monoclonal gammopathy  versus myeloma    Monoclonal gammopathy  SPEP with IgG 2768, M spike at 2.0 IgG monoclonal protein with lambda specificity  Free light chain kappa 13.4, free light chain lambda at 199.9 with kappa lambda ratio of 0.07  Given such a significant elevation of monoclonal protein there is definitely underlying plasma cell dyscrasia  Skeletal survey with some lytic lesion seen will get bone scan and PET/CT  We will also get bone marrow biopsy to look at the plasma cell concentration in the bone marrow  If she satisfies myeloma defining event we will need to start treatment for multiple myeloma currently no criteria satisfied given normal creatinine, calcium, hemoglobin    I will see her back once above work-up is completed.    Left lower extremity pain and swelling  Discussed the need to rule out DVT  Possible provoking factor of decreased mobility due to her arthralgias also possible underlying malignancy as above  Could also be swelling related to musculoskeletal issues as her Mobic has been on hold for her upcoming bone marrow biopsy  Send for stat venous Doppler ultrasound of the left lower extremity.  Patient to be held and results called.  If positive will need anticoagulation and patient will be brought back into the office to discuss posttesting.  Discussed continued use of Percocet and the amount of acetaminophen that can be used as needed to help supplement her pain medication while her Mobic is on hold.      Thank you very much for providing the opportunity to participate in this patient’s care. Please do not hesitate to call if there are any other questions.  Time spent on encounter including record review, history taking, exam, discussion, counseling and documentation at: 60 minutes

## 2023-07-31 ENCOUNTER — HOSPITAL ENCOUNTER (OUTPATIENT)
Dept: CT IMAGING | Facility: HOSPITAL | Age: 63
Discharge: HOME | End: 2023-07-31
Admitting: RADIOLOGY
Payer: COMMERCIAL

## 2023-07-31 VITALS
RESPIRATION RATE: 16 BRPM | TEMPERATURE: 98.2 F | DIASTOLIC BLOOD PRESSURE: 56 MMHG | WEIGHT: 174 LBS | SYSTOLIC BLOOD PRESSURE: 116 MMHG | BODY MASS INDEX: 29.71 KG/M2 | HEART RATE: 83 BPM | OXYGEN SATURATION: 98 % | HEIGHT: 64 IN

## 2023-07-31 DIAGNOSIS — D47.2 MGUS (MONOCLONAL GAMMOPATHY OF UNKNOWN SIGNIFICANCE): ICD-10-CM

## 2023-07-31 LAB
APTT PPP: 28.8 SECONDS (ref 24–31)
BASOPHILS # BLD AUTO: 0 10*3/MM3 (ref 0–0.2)
BASOPHILS NFR BLD AUTO: 0.6 % (ref 0–1.5)
DEPRECATED RDW RBC AUTO: 46.8 FL (ref 37–54)
EOSINOPHIL # BLD AUTO: 0.1 10*3/MM3 (ref 0–0.4)
EOSINOPHIL NFR BLD AUTO: 1.3 % (ref 0.3–6.2)
ERYTHROCYTE [DISTWIDTH] IN BLOOD BY AUTOMATED COUNT: 12.8 % (ref 12.3–15.4)
HCT VFR BLD AUTO: 35.3 % (ref 34–46.6)
HGB BLD-MCNC: 11.5 G/DL (ref 12–15.9)
INR PPP: 1 (ref 0.93–1.1)
LYMPHOCYTES # BLD AUTO: 2.4 10*3/MM3 (ref 0.7–3.1)
LYMPHOCYTES NFR BLD AUTO: 31.1 % (ref 19.6–45.3)
MCH RBC QN AUTO: 31.9 PG (ref 26.6–33)
MCHC RBC AUTO-ENTMCNC: 32.5 G/DL (ref 31.5–35.7)
MCV RBC AUTO: 98.2 FL (ref 79–97)
MONOCYTES # BLD AUTO: 0.6 10*3/MM3 (ref 0.1–0.9)
MONOCYTES NFR BLD AUTO: 7.8 % (ref 5–12)
NEUTROPHILS NFR BLD AUTO: 4.5 10*3/MM3 (ref 1.7–7)
NEUTROPHILS NFR BLD AUTO: 59.2 % (ref 42.7–76)
NRBC BLD AUTO-RTO: 0.1 /100 WBC (ref 0–0.2)
PLATELET # BLD AUTO: 283 10*3/MM3 (ref 140–450)
PMV BLD AUTO: 7.5 FL (ref 6–12)
PROTHROMBIN TIME: 10.7 SECONDS (ref 9.6–11.7)
RBC # BLD AUTO: 3.59 10*6/MM3 (ref 3.77–5.28)
WBC NRBC COR # BLD: 7.6 10*3/MM3 (ref 3.4–10.8)

## 2023-07-31 PROCEDURE — 85730 THROMBOPLASTIN TIME PARTIAL: CPT | Performed by: RADIOLOGY

## 2023-07-31 PROCEDURE — 77012 CT SCAN FOR NEEDLE BIOPSY: CPT

## 2023-07-31 PROCEDURE — 0 LIDOCAINE 1 % SOLUTION: Performed by: RADIOLOGY

## 2023-07-31 PROCEDURE — 85025 COMPLETE CBC W/AUTO DIFF WBC: CPT | Performed by: RADIOLOGY

## 2023-07-31 PROCEDURE — 25010000002 FENTANYL CITRATE (PF) 50 MCG/ML SOLUTION: Performed by: RADIOLOGY

## 2023-07-31 PROCEDURE — 85610 PROTHROMBIN TIME: CPT | Performed by: RADIOLOGY

## 2023-07-31 PROCEDURE — 99152 MOD SED SAME PHYS/QHP 5/>YRS: CPT

## 2023-07-31 PROCEDURE — 25010000002 MIDAZOLAM PER 1 MG: Performed by: RADIOLOGY

## 2023-07-31 PROCEDURE — 25010000002 ONDANSETRON PER 1 MG: Performed by: RADIOLOGY

## 2023-07-31 RX ORDER — SODIUM CHLORIDE 0.9 % (FLUSH) 0.9 %
10 SYRINGE (ML) INJECTION EVERY 12 HOURS SCHEDULED
Status: DISCONTINUED | OUTPATIENT
Start: 2023-07-31 | End: 2023-08-01 | Stop reason: HOSPADM

## 2023-07-31 RX ORDER — SODIUM CHLORIDE 0.9 % (FLUSH) 0.9 %
10 SYRINGE (ML) INJECTION AS NEEDED
Status: DISCONTINUED | OUTPATIENT
Start: 2023-07-31 | End: 2023-08-01 | Stop reason: HOSPADM

## 2023-07-31 RX ORDER — ONDANSETRON 2 MG/ML
INJECTION INTRAMUSCULAR; INTRAVENOUS AS NEEDED
Status: COMPLETED | OUTPATIENT
Start: 2023-07-31 | End: 2023-07-31

## 2023-07-31 RX ORDER — MIDAZOLAM HYDROCHLORIDE 1 MG/ML
INJECTION INTRAMUSCULAR; INTRAVENOUS AS NEEDED
Status: COMPLETED | OUTPATIENT
Start: 2023-07-31 | End: 2023-07-31

## 2023-07-31 RX ORDER — LIDOCAINE HYDROCHLORIDE 10 MG/ML
INJECTION, SOLUTION INFILTRATION; PERINEURAL AS NEEDED
Status: COMPLETED | OUTPATIENT
Start: 2023-07-31 | End: 2023-07-31

## 2023-07-31 RX ORDER — SODIUM CHLORIDE 9 MG/ML
75 INJECTION, SOLUTION INTRAVENOUS CONTINUOUS
Status: DISCONTINUED | OUTPATIENT
Start: 2023-07-31 | End: 2023-08-01 | Stop reason: HOSPADM

## 2023-07-31 RX ORDER — FENTANYL CITRATE 50 UG/ML
INJECTION, SOLUTION INTRAMUSCULAR; INTRAVENOUS AS NEEDED
Status: COMPLETED | OUTPATIENT
Start: 2023-07-31 | End: 2023-07-31

## 2023-07-31 RX ADMIN — ONDANSETRON 4 MG: 2 INJECTION INTRAMUSCULAR; INTRAVENOUS at 13:46

## 2023-07-31 RX ADMIN — Medication 10 ML: at 12:58

## 2023-07-31 RX ADMIN — SODIUM CHLORIDE 75 ML/HR: 9 INJECTION, SOLUTION INTRAVENOUS at 12:58

## 2023-07-31 RX ADMIN — LIDOCAINE HYDROCHLORIDE 10 ML: 10 INJECTION, SOLUTION INFILTRATION; PERINEURAL at 13:59

## 2023-07-31 RX ADMIN — MIDAZOLAM 1 MG: 1 INJECTION INTRAMUSCULAR; INTRAVENOUS at 13:58

## 2023-07-31 RX ADMIN — FENTANYL CITRATE 50 MCG: 50 INJECTION, SOLUTION INTRAMUSCULAR; INTRAVENOUS at 13:52

## 2023-07-31 RX ADMIN — MIDAZOLAM 1 MG: 1 INJECTION INTRAMUSCULAR; INTRAVENOUS at 13:54

## 2023-07-31 RX ADMIN — FENTANYL CITRATE 50 MCG: 50 INJECTION, SOLUTION INTRAMUSCULAR; INTRAVENOUS at 13:48

## 2023-08-01 LAB — Lab: NORMAL

## 2023-08-03 ENCOUNTER — OFFICE VISIT (OUTPATIENT)
Dept: ORTHOPEDIC SURGERY | Facility: CLINIC | Age: 63
End: 2023-08-03
Payer: COMMERCIAL

## 2023-08-03 VITALS — HEIGHT: 64 IN | BODY MASS INDEX: 29.71 KG/M2 | WEIGHT: 174 LBS | OXYGEN SATURATION: 100 % | HEART RATE: 63 BPM

## 2023-08-03 DIAGNOSIS — R76.8 ELEVATED ANTINUCLEAR ANTIBODY (ANA) LEVEL: ICD-10-CM

## 2023-08-03 DIAGNOSIS — M71.22 BAKER'S CYST OF KNEE, LEFT: ICD-10-CM

## 2023-08-03 DIAGNOSIS — G89.29 CHRONIC PAIN OF BOTH KNEES: Primary | ICD-10-CM

## 2023-08-03 DIAGNOSIS — M25.562 CHRONIC PAIN OF BOTH KNEES: Primary | ICD-10-CM

## 2023-08-03 DIAGNOSIS — D47.2 MONOCLONAL GAMMOPATHY: ICD-10-CM

## 2023-08-03 DIAGNOSIS — M25.561 CHRONIC PAIN OF BOTH KNEES: Primary | ICD-10-CM

## 2023-08-03 DIAGNOSIS — M17.0 BILATERAL PRIMARY OSTEOARTHRITIS OF KNEE: ICD-10-CM

## 2023-08-03 RX ADMIN — TRIAMCINOLONE ACETONIDE 40 MG: 40 INJECTION, SUSPENSION INTRA-ARTICULAR; INTRAMUSCULAR at 13:53

## 2023-08-03 RX ADMIN — LIDOCAINE HYDROCHLORIDE 4 ML: 10 INJECTION, SOLUTION EPIDURAL; INFILTRATION; INTRACAUDAL; PERINEURAL at 13:53

## 2023-08-04 RX ORDER — TRIAMCINOLONE ACETONIDE 40 MG/ML
40 INJECTION, SUSPENSION INTRA-ARTICULAR; INTRAMUSCULAR
Status: COMPLETED | OUTPATIENT
Start: 2023-08-03 | End: 2023-08-03

## 2023-08-04 RX ORDER — LIDOCAINE HYDROCHLORIDE 10 MG/ML
4 INJECTION, SOLUTION EPIDURAL; INFILTRATION; INTRACAUDAL; PERINEURAL
Status: COMPLETED | OUTPATIENT
Start: 2023-08-03 | End: 2023-08-03

## 2023-08-04 NOTE — PROGRESS NOTES
HEMATOLOGY ONCOLOGY OUTPATIENT FOLLOW UP      Patient name: Tae Henao  : 1960  MRN: [id number]  Primary Care Physician: Reilly Ruano APRN  Referring Physician: Reilly Ruano APRN  Reason For Consult:       History of Present Illness:  Patient is a 63 y.o. 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    Patient has had recurrent urinary tract infections since 2023.  Patient has had cystoscopy which was unremarkable  She also complains of rash along with flulike symptoms since Memorial Day.  Rash is predominantly on her legs and was on her face look like urticaria.  She has a photo of the rash which has now improved significantly.  She also been having pain in her chest back.  She was referred to dermatology with this rashes and her DONOVAN was positive  She was prescribed prednisone and the rash improved.  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  Kappa lambda ratio in the urine 0.56  PET scan with no evidence of malignancy    Subjective:  Patient is here for follow-up after bone marrow biopsy symptomatic improvement with her pain no other new symptoms      Past Medical History:   Diagnosis Date    Arthritis     Depression     HTN (hypertension)     Joint pain        Past Surgical History:   Procedure Laterality Date      SECTION  1986    GASTRIC BYPASS  2006         Current Outpatient Medications:     aspirin 81 MG EC tablet, Take 1 tablet by mouth Daily., Disp: , Rfl:     citalopram (CeleXA) 40 MG tablet, Daily., Disp: , Rfl:     ferrous sulfate 325 (65 FE) MG tablet, Take 1 tablet by mouth Daily., Disp: , Rfl: 1    gabapentin (NEURONTIN) 300 MG capsule, Take 1 capsule by mouth Daily., Disp: , Rfl: 1    meloxicam (MOBIC) 15 MG tablet, Daily., Disp: , Rfl:     Methenamine-Sodium Salicylate (CYSTEX PO), Take  by mouth. (Patient not taking: Reported on 8/3/2023), Disp: , Rfl:     metoprolol tartrate (LOPRESSOR) 50 MG tablet, , Disp: , Rfl: 1    multivitamin with minerals tablet tablet, Take 1 tablet by mouth Daily., Disp: , Rfl:     Omeprazole 20 MG tablet delayed-release, Daily., Disp: , Rfl:     oxyCODONE-acetaminophen (PERCOCET) 5-325 MG per tablet, Take 1 tablet by mouth Every 4 (Four) Hours As Needed for Moderate Pain., Disp: 180 tablet, Rfl: 0    Probiotic Product (FORTIFY DAILY PROBIOTIC PO), Take  by mouth., Disp: , Rfl:     spironolactone (ALDACTONE) 25 MG tablet, Take 1 tablet by mouth Daily., Disp: , Rfl: 1    traMADol (ULTRAM) 50 MG tablet, Take 1 tablet by mouth Every Night., Disp: 30 tablet, Rfl: 0  No current facility-administered medications for this visit.    Allergies   Allergen Reactions    Morphine Unknown (See Comments)    Azithromycin Unknown (See Comments)    Penicillins Unknown (See Comments)       Family History   Problem Relation Age of Onset    Arthritis Mother     Diabetes Mother     Hypertension Mother     Hypertension Father     Heart disease Father     Arthritis Father     Cancer Sister     Stroke Sister        Cancer-related family history includes Cancer in her sister.      Social History     Tobacco Use    Smoking status: Former     Types: Cigarettes    Smokeless tobacco: Never   Substance Use Topics    Alcohol use: Yes    Drug use: No     Social History     Social History Narrative    Not on file        ROS:   Review of Systems   Constitutional:  Positive for fatigue. Negative for fever.   HENT:  Negative for congestion and nosebleeds.    Eyes:  Negative for pain.   Respiratory:  Negative for cough and shortness of breath.    Cardiovascular:  Negative for chest pain.   Gastrointestinal:  Negative for abdominal pain, blood in stool, diarrhea, nausea and vomiting.   Endocrine: Negative for cold intolerance and heat intolerance.   Genitourinary:  Negative for difficulty urinating.   Musculoskeletal:  Positive for arthralgias and myalgias.   Skin:  Negative for rash.   Neurological:  Positive for weakness. Negative for dizziness and headaches.   Hematological:  Does not bruise/bleed easily.   Psychiatric/Behavioral:  Negative for behavioral problems.        Objective:    Vital Signs:  There were no vitals filed for this visit.    There is no height or weight on file to calculate BMI.    ECOG  (0) Fully active, able to carry on all predisease performance without restriction    Physical Exam:   Physical Exam  Constitutional:       Appearance: Normal appearance.   HENT:      Head: Normocephalic and atraumatic.   Eyes:      Pupils: Pupils are equal, round, and reactive to light.   Cardiovascular:      Rate and Rhythm: Normal rate and regular rhythm.      Pulses: Normal pulses.      Heart sounds: No murmur heard.  Pulmonary:      Effort: Pulmonary effort is normal.      Breath sounds: Normal breath sounds.   Abdominal:      General: There is no distension.      Palpations: Abdomen is soft. There is no mass.      Tenderness: There is no abdominal tenderness.   Musculoskeletal:         General: Normal range of motion.      Cervical back: Normal range of motion and neck supple.   Skin:     General: Skin is warm.   Neurological:      General: No focal deficit present.      Mental Status: She is alert.   Psychiatric:         Mood and Affect: Mood normal.       Lab Results - Last 18 Months   Lab Units 07/31/23  1244  07/28/23  1113 07/11/23  1439   WBC 10*3/mm3 7.60 6.93 8.79   HEMOGLOBIN g/dL 11.5* 11.2* 12.0   HEMATOCRIT % 35.3 35.0 38.0   PLATELETS 10*3/mm3 283 235 270   MCV fL 98.2* 101.7* 104.7*       Lab Results - Last 18 Months   Lab Units 07/11/23  1439   SODIUM mmol/L 133*   POTASSIUM mmol/L 4.9   CHLORIDE mmol/L 100   CO2 mmol/L 26.0   BUN mg/dL 13   CREATININE mg/dL 0.78   CALCIUM mg/dL 9.9   BILIRUBIN mg/dL 0.3   ALK PHOS U/L 120*   ALT (SGPT) U/L 27   AST (SGOT) U/L 25   GLUCOSE mg/dL 83         Lab Results   Component Value Date    GLUCOSE 83 07/11/2023    BUN 13 07/11/2023    CREATININE 0.78 07/11/2023    EGFRIFNONA 64 07/11/2019    BCR 16.7 07/11/2023    K 4.9 07/11/2023    CO2 26.0 07/11/2023    CALCIUM 9.9 07/11/2023    PROTENTOTREF 8.3 07/11/2023    ALBUMIN 4.3 07/11/2023    ALBUMIN 3.8 07/11/2023    LABIL2 0.9 07/11/2023    AST 25 07/11/2023    ALT 27 07/11/2023       Lab Results - Last 18 Months   Lab Units 07/31/23  1241   INR  1.00   APTT seconds 28.8       Lab Results   Component Value Date    IRON 41 06/25/2018    TIBC 512 (H) 06/25/2018       No results found for: FOLATE    No results found for: OCCULTBLD    No results found for: RETICCTPCT  No results found for: VNIHNLOS03  No results found for: SPEP, UPEP  No results found for: LDH, URICACID  Lab Results   Component Value Date    SEDRATE 53 (H) 07/11/2019     Lab Results   Component Value Date    HAPTOGLOBIN 142 06/25/2018     Lab Results   Component Value Date    PTT 28.8 07/31/2023    INR 1.00 07/31/2023     No results found for:   No results found for: CEA  No components found for: CA-19-9  No results found for: PSA  Lab Results   Component Value Date    SEDRATE 53 (H) 07/11/2019          Assessment & Plan     Patient is a 63-year-old female with intermittent urticarial rash, arthralgias findings concerning for monoclonal gammopathy versus myeloma    Smoldering multiple myeloma  SPEP with IgG 2768, M spike at 2.0 IgG monoclonal protein with  lambda specificity  Free light chain kappa 13.4, free light chain lambda at 199.9 with kappa lambda ratio of 0.07  Given such a significant elevation of monoclonal protein there is definitely underlying plasma cell dyscrasia  PET/CT with no bone lesions no other crab criteria satisfied  Bone marrow biopsy with 20% plasma cells  This is consistent with a diagnosis of smoldering multiple myeloma  I discussed that there has been studies with using Revlimid and other medications and smoldering myeloma however no overall survival benefit has been shown.  Currently I would recommend close surveillance starting treatment once she satisfies myeloma defining events this way we can decrease toxicity associate with any treatment    Patient is agreeable with the plan I will see her back in 3 months      Thank you very much for providing the opportunity to participate in this patient's care. Please do not hesitate to call if there are any other questions.  Time spent on encounter including record review, history taking, exam, discussion, counseling and documentation at: 40 minutes

## 2023-08-07 ENCOUNTER — OFFICE VISIT (OUTPATIENT)
Dept: ONCOLOGY | Facility: CLINIC | Age: 63
End: 2023-08-07
Payer: COMMERCIAL

## 2023-08-07 VITALS
HEART RATE: 71 BPM | OXYGEN SATURATION: 97 % | SYSTOLIC BLOOD PRESSURE: 137 MMHG | HEIGHT: 64 IN | TEMPERATURE: 98.2 F | WEIGHT: 172.6 LBS | DIASTOLIC BLOOD PRESSURE: 73 MMHG | RESPIRATION RATE: 16 BRPM | BODY MASS INDEX: 29.47 KG/M2

## 2023-08-07 DIAGNOSIS — D47.2 MGUS (MONOCLONAL GAMMOPATHY OF UNKNOWN SIGNIFICANCE): Primary | ICD-10-CM

## 2023-08-07 NOTE — LETTER
2023     TUNDE Levin  [address] [place] [address]    Patient: Tae Henao   YOB: 1960   Date of Visit: 2023     Dear TUNDE Levin:       Thank you for referring Tae Henao to me for evaluation. Below are the relevant portions of my assessment and plan of care.    If you have questions, please do not hesitate to call me. I look forward to following Tae along with you.         Sincerely,        Miya Leon MD        CC: No Recipients    Miya Leon MD  [phone]  Sign when Signing Visit                           HEMATOLOGY ONCOLOGY OUTPATIENT FOLLOW UP      Patient name: Tae Henao  : 1960  MRN: [id number]  Primary Care Physician: Reilly Ruano APRN  Referring Physician: Reilly Ruano APRN  Reason For Consult:       History of Present Illness:  Patient is a 63 y.o. 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    Patient has had recurrent urinary tract infections since 2023.  Patient has had cystoscopy which was unremarkable  She also complains of rash along with flulike symptoms since Memorial Day.  Rash is predominantly on her legs and was on her face look like urticaria.  She has a photo of the rash which has now improved significantly.  She also been having pain in her chest back.  She was referred to dermatology with this rashes and her DONOVAN was positive  She was prescribed prednisone and the rash improved.  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  Kappa lambda ratio in the urine 0.56  PET scan with no evidence of malignancy    Subjective:  Patient is here for follow-up after bone marrow biopsy symptomatic improvement with her pain no other new symptoms      Past Medical History:   Diagnosis Date    Arthritis     Depression     HTN (hypertension)     Joint pain        Past Surgical History:   Procedure Laterality Date     SECTION      GASTRIC BYPASS           Current Outpatient Medications:     aspirin 81 MG EC tablet, Take 1 tablet by mouth Daily., Disp: , Rfl:     citalopram (CeleXA) 40 MG tablet, Daily., Disp: , Rfl:     ferrous sulfate 325 (65 FE) MG tablet, Take 1 tablet by mouth Daily., Disp: , Rfl: 1    gabapentin (NEURONTIN) 300 MG capsule, Take 1 capsule by mouth Daily., Disp: , Rfl: 1    meloxicam (MOBIC) 15 MG tablet, Daily., Disp: , Rfl:     Methenamine-Sodium Salicylate (CYSTEX PO), Take  by mouth. (Patient not taking: Reported on 8/3/2023), Disp: , Rfl:     metoprolol tartrate (LOPRESSOR) 50 MG tablet, , Disp: , Rfl: 1    multivitamin with minerals tablet tablet, Take 1 tablet by mouth Daily., Disp: , Rfl:     Omeprazole 20 MG tablet delayed-release, Daily., Disp: , Rfl:     oxyCODONE-acetaminophen (PERCOCET) 5-325 MG per tablet, Take 1 tablet by mouth Every 4 (Four) Hours As Needed for Moderate Pain., Disp: 180 tablet, Rfl: 0    Probiotic Product (FORTIFY DAILY PROBIOTIC PO), Take  by mouth., Disp: , Rfl:     spironolactone (ALDACTONE) 25 MG tablet, Take 1 tablet by mouth Daily., Disp: , Rfl: 1    traMADol (ULTRAM) 50 MG tablet, Take 1 tablet by mouth Every Night., Disp: 30 tablet, Rfl: 0  No current facility-administered medications for this visit.    Allergies   Allergen Reactions    Morphine Unknown (See Comments)    Azithromycin  Unknown (See Comments)    Penicillins Unknown (See Comments)       Family History   Problem Relation Age of Onset    Arthritis Mother     Diabetes Mother     Hypertension Mother     Hypertension Father     Heart disease Father     Arthritis Father     Cancer Sister     Stroke Sister        Cancer-related family history includes Cancer in her sister.      Social History     Tobacco Use    Smoking status: Former     Types: Cigarettes    Smokeless tobacco: Never   Substance Use Topics    Alcohol use: Yes    Drug use: No     Social History     Social History Narrative    Not on file       ROS:   Review of Systems   Constitutional:  Positive for fatigue. Negative for fever.   HENT:  Negative for congestion and nosebleeds.    Eyes:  Negative for pain.   Respiratory:  Negative for cough and shortness of breath.    Cardiovascular:  Negative for chest pain.   Gastrointestinal:  Negative for abdominal pain, blood in stool, diarrhea, nausea and vomiting.   Endocrine: Negative for cold intolerance and heat intolerance.   Genitourinary:  Negative for difficulty urinating.   Musculoskeletal:  Positive for arthralgias and myalgias.   Skin:  Negative for rash.   Neurological:  Positive for weakness. Negative for dizziness and headaches.   Hematological:  Does not bruise/bleed easily.   Psychiatric/Behavioral:  Negative for behavioral problems.        Objective:    Vital Signs:  There were no vitals filed for this visit.    There is no height or weight on file to calculate BMI.    ECOG  (0) Fully active, able to carry on all predisease performance without restriction    Physical Exam:   Physical Exam  Constitutional:       Appearance: Normal appearance.   HENT:      Head: Normocephalic and atraumatic.   Eyes:      Pupils: Pupils are equal, round, and reactive to light.   Cardiovascular:      Rate and Rhythm: Normal rate and regular rhythm.      Pulses: Normal pulses.      Heart sounds: No murmur heard.  Pulmonary:       Effort: Pulmonary effort is normal.      Breath sounds: Normal breath sounds.   Abdominal:      General: There is no distension.      Palpations: Abdomen is soft. There is no mass.      Tenderness: There is no abdominal tenderness.   Musculoskeletal:         General: Normal range of motion.      Cervical back: Normal range of motion and neck supple.   Skin:     General: Skin is warm.   Neurological:      General: No focal deficit present.      Mental Status: She is alert.   Psychiatric:         Mood and Affect: Mood normal.       Lab Results - Last 18 Months   Lab Units 07/31/23  1241 07/28/23  1113 07/11/23  1439   WBC 10*3/mm3 7.60 6.93 8.79   HEMOGLOBIN g/dL 11.5* 11.2* 12.0   HEMATOCRIT % 35.3 35.0 38.0   PLATELETS 10*3/mm3 283 235 270   MCV fL 98.2* 101.7* 104.7*       Lab Results - Last 18 Months   Lab Units 07/11/23  1439   SODIUM mmol/L 133*   POTASSIUM mmol/L 4.9   CHLORIDE mmol/L 100   CO2 mmol/L 26.0   BUN mg/dL 13   CREATININE mg/dL 0.78   CALCIUM mg/dL 9.9   BILIRUBIN mg/dL 0.3   ALK PHOS U/L 120*   ALT (SGPT) U/L 27   AST (SGOT) U/L 25   GLUCOSE mg/dL 83         Lab Results   Component Value Date    GLUCOSE 83 07/11/2023    BUN 13 07/11/2023    CREATININE 0.78 07/11/2023    EGFRIFNONA 64 07/11/2019    BCR 16.7 07/11/2023    K 4.9 07/11/2023    CO2 26.0 07/11/2023    CALCIUM 9.9 07/11/2023    PROTENTOTREF 8.3 07/11/2023    ALBUMIN 4.3 07/11/2023    ALBUMIN 3.8 07/11/2023    LABIL2 0.9 07/11/2023    AST 25 07/11/2023    ALT 27 07/11/2023       Lab Results - Last 18 Months   Lab Units 07/31/23  1241   INR  1.00   APTT seconds 28.8       Lab Results   Component Value Date    IRON 41 06/25/2018    TIBC 512 (H) 06/25/2018       No results found for: FOLATE    No results found for: OCCULTBLD    No results found for: RETICCTPCT  No results found for: [id number]  No results found for: SPEP, UPEP  No results found for: LDH, URICACID  Lab Results   Component Value Date    [facility] 53 (H) 07/11/2019     Lab Results    Component Value Date    HAPTOGLOBIN 142 06/25/2018     Lab Results   Component Value Date    PTT 28.8 07/31/2023    INR 1.00 07/31/2023     No results found for:   No results found for: CEA  No components found for: CA-19-9  No results found for: PSA  Lab Results   Component Value Date    SEDRATE 53 (H) 07/11/2019          Assessment & Plan     Patient is a 63-year-old female with intermittent urticarial rash, arthralgias findings concerning for monoclonal gammopathy versus myeloma    Smoldering multiple myeloma  SPEP with IgG 2768, M spike at 2.0 IgG monoclonal protein with lambda specificity  Free light chain kappa 13.4, free light chain lambda at 199.9 with kappa lambda ratio of 0.07  Given such a significant elevation of monoclonal protein there is definitely underlying plasma cell dyscrasia  PET/CT with no bone lesions no other crab criteria satisfied  Bone marrow biopsy with 20% plasma cells  This is consistent with a diagnosis of smoldering multiple myeloma  I discussed that there has been studies with using Revlimid and other medications and smoldering myeloma however no overall survival benefit has been shown.  Currently I would recommend close surveillance starting treatment once she satisfies myeloma defining events this way we can decrease toxicity associate with any treatment    Patient is agreeable with the plan I will see her back in 3 months      Thank you very much for providing the opportunity to participate in this patient's care. Please do not hesitate to call if there are any other questions.  Time spent on encounter including record review, history taking, exam, discussion, counseling and documentation at: 40 minutes

## 2023-08-07 NOTE — LETTER
2023     Nichole Conrad MD  [address] [facility] [place] [address]    Patient: Tae Henao   YOB: 1960   Date of Visit: 2023     Dear Nichole Conrad MD:       Thank you for referring Tae Henao to me for evaluation. Below are the relevant portions of my assessment and plan of care.    If you have questions, please do not hesitate to call me. I look forward to following Tae along with you.         Sincerely,        Miya Leon MD        CC: No Recipients    Miya Leon MD  23 [phone]  Sign when Signing Visit                           HEMATOLOGY ONCOLOGY OUTPATIENT FOLLOW UP      Patient name: Tae Henao  : 1960  MRN: [id number]  Primary Care Physician: Reilly Ruano APRN  Referring Physician: Reilly Ruano APRN  Reason For Consult:       History of Present Illness:  Patient is a 63 y.o. 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    Patient has had recurrent urinary tract infections since 2023.  Patient has had cystoscopy which was unremarkable  She also complains of rash along with flulike symptoms since Memorial Day.  Rash is predominantly on her legs and was on her face look like urticaria.  She has a photo of the rash which has now improved significantly.  She also been having pain in her chest back.  She was referred to dermatology with this rashes and her DONOVAN was positive  She was prescribed prednisone and the rash improved.  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  Kappa lambda ratio in the urine 0.56  PET scan with no evidence of malignancy    Subjective:  Patient is here for follow-up after bone marrow biopsy symptomatic improvement with her pain no other new symptoms      Past Medical History:   Diagnosis Date    Arthritis     Depression     HTN (hypertension)     Joint pain        Past Surgical History:   Procedure Laterality Date     SECTION      GASTRIC BYPASS           Current Outpatient Medications:     aspirin 81 MG EC tablet, Take 1 tablet by mouth Daily., Disp: , Rfl:     citalopram (CeleXA) 40 MG tablet, Daily., Disp: , Rfl:     ferrous sulfate 325 (65 FE) MG tablet, Take 1 tablet by mouth Daily., Disp: , Rfl: 1    gabapentin (NEURONTIN) 300 MG capsule, Take 1 capsule by mouth Daily., Disp: , Rfl: 1    meloxicam (MOBIC) 15 MG tablet, Daily., Disp: , Rfl:     Methenamine-Sodium Salicylate (CYSTEX PO), Take  by mouth. (Patient not taking: Reported on 8/3/2023), Disp: , Rfl:     metoprolol tartrate (LOPRESSOR) 50 MG tablet, , Disp: , Rfl: 1    multivitamin with minerals tablet tablet, Take 1 tablet by mouth Daily., Disp: , Rfl:     Omeprazole 20 MG tablet delayed-release, Daily., Disp: , Rfl:     oxyCODONE-acetaminophen (PERCOCET) 5-325 MG per tablet, Take 1 tablet by mouth Every 4 (Four) Hours As Needed for Moderate Pain., Disp: 180 tablet, Rfl: 0    Probiotic Product (FORTIFY DAILY PROBIOTIC PO), Take  by mouth., Disp: , Rfl:     spironolactone (ALDACTONE) 25 MG tablet, Take 1 tablet by mouth Daily., Disp: , Rfl: 1    traMADol (ULTRAM) 50 MG tablet, Take 1 tablet by mouth Every Night., Disp: 30 tablet, Rfl: 0  No current facility-administered medications for this visit.    Allergies   Allergen Reactions    Morphine Unknown (See Comments)    Azithromycin Unknown  (See Comments)    Penicillins Unknown (See Comments)       Family History   Problem Relation Age of Onset    Arthritis Mother     Diabetes Mother     Hypertension Mother     Hypertension Father     Heart disease Father     Arthritis Father     Cancer Sister     Stroke Sister        Cancer-related family history includes Cancer in her sister.      Social History     Tobacco Use    Smoking status: Former     Types: Cigarettes    Smokeless tobacco: Never   Substance Use Topics    Alcohol use: Yes    Drug use: No     Social History     Social History Narrative    Not on file       ROS:   Review of Systems   Constitutional:  Positive for fatigue. Negative for fever.   HENT:  Negative for congestion and nosebleeds.    Eyes:  Negative for pain.   Respiratory:  Negative for cough and shortness of breath.    Cardiovascular:  Negative for chest pain.   Gastrointestinal:  Negative for abdominal pain, blood in stool, diarrhea, nausea and vomiting.   Endocrine: Negative for cold intolerance and heat intolerance.   Genitourinary:  Negative for difficulty urinating.   Musculoskeletal:  Positive for arthralgias and myalgias.   Skin:  Negative for rash.   Neurological:  Positive for weakness. Negative for dizziness and headaches.   Hematological:  Does not bruise/bleed easily.   Psychiatric/Behavioral:  Negative for behavioral problems.        Objective:    Vital Signs:  There were no vitals filed for this visit.    There is no height or weight on file to calculate BMI.    ECOG  (0) Fully active, able to carry on all predisease performance without restriction    Physical Exam:   Physical Exam  Constitutional:       Appearance: Normal appearance.   HENT:      Head: Normocephalic and atraumatic.   Eyes:      Pupils: Pupils are equal, round, and reactive to light.   Cardiovascular:      Rate and Rhythm: Normal rate and regular rhythm.      Pulses: Normal pulses.      Heart sounds: No murmur heard.  Pulmonary:      Effort:  Pulmonary effort is normal.      Breath sounds: Normal breath sounds.   Abdominal:      General: There is no distension.      Palpations: Abdomen is soft. There is no mass.      Tenderness: There is no abdominal tenderness.   Musculoskeletal:         General: Normal range of motion.      Cervical back: Normal range of motion and neck supple.   Skin:     General: Skin is warm.   Neurological:      General: No focal deficit present.      Mental Status: She is alert.   Psychiatric:         Mood and Affect: Mood normal.       Lab Results - Last 18 Months   Lab Units 07/31/23  1241 07/28/23  1113 07/11/23  1439   WBC 10*3/mm3 7.60 6.93 8.79   HEMOGLOBIN g/dL 11.5* 11.2* 12.0   HEMATOCRIT % 35.3 35.0 38.0   PLATELETS 10*3/mm3 283 235 270   MCV fL 98.2* 101.7* 104.7*       Lab Results - Last 18 Months   Lab Units 07/11/23  1439   SODIUM mmol/L 133*   POTASSIUM mmol/L 4.9   CHLORIDE mmol/L 100   CO2 mmol/L 26.0   BUN mg/dL 13   CREATININE mg/dL 0.78   CALCIUM mg/dL 9.9   BILIRUBIN mg/dL 0.3   ALK PHOS U/L 120*   ALT (SGPT) U/L 27   AST (SGOT) U/L 25   GLUCOSE mg/dL 83         Lab Results   Component Value Date    GLUCOSE 83 07/11/2023    BUN 13 07/11/2023    CREATININE 0.78 07/11/2023    EGFRIFNONA 64 07/11/2019    BCR 16.7 07/11/2023    K 4.9 07/11/2023    CO2 26.0 07/11/2023    CALCIUM 9.9 07/11/2023    PROTENTOTREF 8.3 07/11/2023    ALBUMIN 4.3 07/11/2023    ALBUMIN 3.8 07/11/2023    LABIL2 0.9 07/11/2023    AST 25 07/11/2023    ALT 27 07/11/2023       Lab Results - Last 18 Months   Lab Units 07/31/23  1241   INR  1.00   APTT seconds 28.8       Lab Results   Component Value Date    IRON 41 06/25/2018    TIBC 512 (H) 06/25/2018       No results found for: FOLATE    No results found for: OCCULTBLD    No results found for: RETICCTPCT  No results found for: DUOUWFKL60  No results found for: SPEP, UPEP  No results found for: LDH, URICACID  Lab Results   Component Value Date    Banner 53 (H) 07/11/2019     Lab Results    Component Value Date    HAPTOGLOBIN 142 06/25/2018     Lab Results   Component Value Date    PTT 28.8 07/31/2023    INR 1.00 07/31/2023     No results found for:   No results found for: CEA  No components found for: CA-19-9  No results found for: PSA  Lab Results   Component Value Date    SEDRATE 53 (H) 07/11/2019          Assessment & Plan     Patient is a 63-year-old female with intermittent urticarial rash, arthralgias findings concerning for monoclonal gammopathy versus myeloma    Smoldering multiple myeloma  SPEP with IgG 2768, M spike at 2.0 IgG monoclonal protein with lambda specificity  Free light chain kappa 13.4, free light chain lambda at 199.9 with kappa lambda ratio of 0.07  Given such a significant elevation of monoclonal protein there is definitely underlying plasma cell dyscrasia  PET/CT with no bone lesions no other crab criteria satisfied  Bone marrow biopsy with 20% plasma cells  This is consistent with a diagnosis of smoldering multiple myeloma  I discussed that there has been studies with using Revlimid and other medications and smoldering myeloma however no overall survival benefit has been shown.  Currently I would recommend close surveillance starting treatment once she satisfies myeloma defining events this way we can decrease toxicity associate with any treatment    Patient is agreeable with the plan I will see her back in 3 months      Thank you very much for providing the opportunity to participate in this patient's care. Please do not hesitate to call if there are any other questions.  Time spent on encounter including record review, history taking, exam, discussion, counseling and documentation at: 40 minutes

## 2023-08-08 LAB — CYTOGENETICS RESULT: NORMAL

## 2023-08-09 LAB
CYTO UR: NORMAL
LAB AP CASE REPORT: NORMAL
LAB AP DIAGNOSIS COMMENT: NORMAL
LAB AP FLOW CYTOMETRY SUMMARY: NORMAL
PATH REPORT.FINAL DX SPEC: NORMAL
PATH REPORT.GROSS SPEC: NORMAL

## 2023-08-11 ENCOUNTER — TELEPHONE (OUTPATIENT)
Dept: ONCOLOGY | Facility: CLINIC | Age: 63
End: 2023-08-11
Payer: COMMERCIAL

## 2023-08-11 NOTE — TELEPHONE ENCOUNTER
Caller: Tae Henao    Relationship: Self    Best call back number: [phone]    What is the best time to reach you: ANYTIME    Who are you requesting to speak with (clinical staff, provider, specific staff member): CLINICAL    What was the call regarding: PATIENT CALLING TO SEE IF HER BONE MARROW RESULTS ARE AVAILABLE YET. PLEASE CALL TO ADVISE.

## 2023-08-11 NOTE — TELEPHONE ENCOUNTER
Contacted patient to let her know that all of the tests have been resulted regarding the bone marrow biopsy. I sent Dr. Leon a message to let him know so he can look over the results and let us know if anything needs to be changed regarding her treatment plan.   I informed patient of the above information and told her we would get back with her as soon as Dr. Leon has a chance to review. She v/u and had no other questions.

## 2023-08-15 ENCOUNTER — TELEPHONE (OUTPATIENT)
Dept: ONCOLOGY | Facility: CLINIC | Age: 63
End: 2023-08-15

## 2023-08-15 NOTE — TELEPHONE ENCOUNTER
Caller: Tae Henao    Relationship: Self    Best call back number: [phone]     What is the best time to reach you: ASAP    Who are you requesting to speak with (clinical staff, provider,  specific staff member): DR TASHA FAIRCHILD'S NURSE    What was the call regarding: PT WOULD LIKE TO SPEAK TO GURINDER, SAYS WE TRIED TO CALL HER YESTERDAY AND SOMETHING WAS WRONG WITH HER PHONE WHEN SHE TRIED TO ANSWER.  IT WAS AFTER HOURS THAT WE WERE CALLING HER, ,PROBABLY ABOUT SOME TEST RESULTS, SHE TRIED TO CALL BACK AND IT WAS AFTER HOURS, SHE IS CONCERNED, PLEASE CALL BACK ASAP THIS MORNING TO ADVISE.

## 2023-08-15 NOTE — TELEPHONE ENCOUNTER
Per Dr. Leon patient was called and made aware her results showed smoldering myeloma. She v/u and had no additional questions.

## 2023-08-30 ENCOUNTER — TELEPHONE (OUTPATIENT)
Dept: ONCOLOGY | Facility: CLINIC | Age: 63
End: 2023-08-30

## 2023-08-30 ENCOUNTER — OFFICE VISIT (OUTPATIENT)
Dept: SPORTS MEDICINE | Facility: CLINIC | Age: 63
End: 2023-08-30
Payer: COMMERCIAL

## 2023-08-30 VITALS — WEIGHT: 172 LBS | HEIGHT: 64 IN | HEART RATE: 71 BPM | BODY MASS INDEX: 29.37 KG/M2 | OXYGEN SATURATION: 98 %

## 2023-08-30 DIAGNOSIS — M25.50 POLYARTHRALGIA: ICD-10-CM

## 2023-08-30 DIAGNOSIS — M17.0 BILATERAL PRIMARY OSTEOARTHRITIS OF KNEE: ICD-10-CM

## 2023-08-30 DIAGNOSIS — M25.561 CHRONIC PAIN OF BOTH KNEES: Primary | ICD-10-CM

## 2023-08-30 DIAGNOSIS — M71.22 SYNOVIAL CYST OF LEFT POPLITEAL SPACE: ICD-10-CM

## 2023-08-30 DIAGNOSIS — D47.2 MONOCLONAL GAMMOPATHY: ICD-10-CM

## 2023-08-30 DIAGNOSIS — G89.29 CHRONIC PAIN OF BOTH KNEES: Primary | ICD-10-CM

## 2023-08-30 DIAGNOSIS — M25.562 CHRONIC PAIN OF BOTH KNEES: Primary | ICD-10-CM

## 2023-08-30 DIAGNOSIS — R76.8 ELEVATED ANTINUCLEAR ANTIBODY (ANA) LEVEL: ICD-10-CM

## 2023-08-30 DIAGNOSIS — M71.21 SYNOVIAL CYST OF RIGHT POPLITEAL SPACE: ICD-10-CM

## 2023-08-30 RX ORDER — LIDOCAINE HYDROCHLORIDE 10 MG/ML
2 INJECTION, SOLUTION EPIDURAL; INFILTRATION; INTRACAUDAL; PERINEURAL
Status: DISCONTINUED | OUTPATIENT
Start: 2023-08-30 | End: 2023-08-30 | Stop reason: HOSPADM

## 2023-08-30 RX ADMIN — LIDOCAINE HYDROCHLORIDE 2 ML: 10 INJECTION, SOLUTION EPIDURAL; INFILTRATION; INTRACAUDAL; PERINEURAL at 13:19

## 2023-08-30 NOTE — PROGRESS NOTES
"FOLLOW UP VISIT    Patient: Tae Henao  ?  YOB: 1960    MRN: [id number]  ?  Chief Complaint   Patient presents with    Right Knee - Follow-up    Left Knee - Follow-up      ?  HPI: Patient returns today for follow-up of bilateral knee pain, swelling and Baker's cyst.  Most recently had bilateral intra-articular corticosteroid injections on 8/3/2023 which she states gave her significant relief for 2 weeks and a slight reduction of her Baker's cyst bilaterally.  Unfortunately she has had a return in her knee symptoms that are severe enough that she has required a wheelchair for any significant distances, and significant pain with standing or walking short distances.  She also does continue to have significant swelling in the posterior aspect of the knee.  Per chart review of recent evaluation with her hematologist, prior work-up consistent with smoldering multiple myeloma.  She states she was also evaluated by an outside rheumatologist with \"multiple vials of blood work\" obtained that per patient revealed no other inflammatory conditions.  Outside of the knees, she also reports periodic joint pain and swelling in multiple other joints including her ankles, and hands.  He also has periodic rash.  He is also followed by dermatologist, and most recently was referred to an allergist per her rheumatologist for further work-up of her multiple symptoms.  Also recently stopped her Mobic upon recommendation of her rheumatologist as this has association with her MGUS per patient.       Allergies:   Allergies   Allergen Reactions    Morphine Unknown (See Comments)    Azithromycin Unknown (See Comments)    Penicillins Unknown (See Comments)       Past Medical History:   Diagnosis Date    Allergic     Arthritis     Depression     Headache     HTN (hypertension)     Joint pain     Osteopenia     Urinary tract infection      Past Surgical History:   Procedure Laterality Date     SECTION      GASTRIC " "BYPASS  2006     Social History     Occupational History    Not on file   Tobacco Use    Smoking status: Former     Packs/day: 1.00     Years: 30.00     Pack years: 30.00     Types: Cigarettes    Smokeless tobacco: Never    Tobacco comments:     Never smoked pregnant. Socially smoke for years   Substance and Sexual Activity    Alcohol use: Yes     Comment: Socially not often    Drug use: No    Sexual activity: Defer      Social History     Social History Narrative    Not on file     Family History   Problem Relation Age of Onset    Arthritis Mother     Diabetes Mother     Hypertension Mother     Hypertension Father     Heart disease Father     Arthritis Father     Cancer Sister     Stroke Sister        Review of Systems  Constitutional: Negative.  Negative for fever.   Musculoskeletal: Positive for joint pain  Skin: Negative.  Negative for rash and wound.    Neurological: Negative for numbness.     Vitals:    08/30/23 1122   Pulse: 71   SpO2: 98%   Weight: 78 kg (172 lb)   Height: 162.6 cm (64\")        Physical Exam  Constitutional: Patient is oriented to person, place, and time. Appears well-developed and well-nourished.   Head: Normocephalic and atraumatic.   Pulmonary/Chest: Effort normal.   Musculoskeletal:   See detailed exam below   Neurological: Alert and oriented to person, place, and time. No sensory deficit. Coordination normal.   Skin: Skin is warm and dry. Capillary refill takes less than 2 seconds. No rash noted. No erythema.     The bilateral knee is without obvious signs of acute bony deformity, quadriceps atrophy, swelling, erythema, ecchymosis.  There is a mild joint effusion bilaterally left greater than right.  There palpable Baker's cyst bilaterally right greater than left.  The patella is without tenderness. Apprehension is negative with medial and lateral glide. Patella crepitus is positive. Patella grind is positive.  Medial joint line is tender to palpation bilaterally left greater than " right.  There is no tenderness to the lateral joint line bilaterally.  Soft tissue including the distal hamstring tendons, pes anserine, quad tendon, patellar tendon, proximal gastroc tendon, distal IT band, and Gerdy's tubercle are nontender. Flexion & extension are limited in endrange flexion and symmetrical. Knee and hip strength is 4/5 and symmetrical. Varus & valgus stress, Lachman's, anterior drawer, Purvi's, and posterior drawer are all negative. Trendelenburg is positive.  Gait is antalgic and painful    Diagnostics:  no diagnostic testing performed this visit        Assessment:  Diagnoses and all orders for this visit:    1. Chronic pain of both knees (Primary)  -     - Large Joint Arthrocentesis: bilateral knee    2. Bilateral primary osteoarthritis of knee  -     - Large Joint Arthrocentesis: bilateral knee    3. Synovial cyst of left popliteal space  -     - Large Joint Arthrocentesis: bilateral knee    4. Synovial cyst of right popliteal space  -     - Large Joint Arthrocentesis: bilateral knee    5. Monoclonal gammopathy    6. Polyarthralgia    7. Elevated antinuclear antibody (DONOVAN) level      Plan      Patient with continued polyarthralgia multiple joints, with intermittent swelling and pain.  Continues to have significant pain, swelling and Baker's cyst of her bilateral knees which was improved for a short period prior intra-articular corticosteroid injections.  She is having significant discomfort from her Baker's cyst's bilaterally today and as such we discussed Baker's cyst aspiration was completed as noted above.  I again noted the Baker's cyst being a symptom of intra-articular pathology/inflammation as opposed to the cause of her knee related symptoms.  She does have moderate bilateral osteoarthritis, however I do not feel this is the sole cause of her continued knee swelling and pain, especially in the setting of additional pain and swelling in multiple other joints, intermittent rash, and  recently diagnosed smoldering multiple myeloma.  Discussed this could potentially be related to her smoldering myeloma or other autoimmune related process.  Does states she was recently evaluated by rheumatology with full blood panel unremarkable for any other autoimmune condition per patient.  Upcoming appointment with allergist per recommendation of her rheumatologist.  I did encourage her to discuss her polyarthralgia with her hematologist, rheumatologist and allergist.  In the meantime we will continue to symptomatically treat her knee pain and underlying osteoarthritis.  We discussed she can return for a repeat intra-articular corticosteroid injection 3 months from her prior injection as this did give her a degree of relief.  Continue other conservative management as noted below.      Physical Therapy discussed.  Declining at this time, will hold pending allergist and other specialist work-up.  Activity modifications discussed and recommended.  Specifically low impact cardiovascular activities and continuing regular knee range of motion  Use of neoprene sleeve brace discussed and recommended as needed  Rest, ice, compression, and elevation (RICE) therapy.  Encouraged regular RICE therapy 3-4 times a day as able to decrease knee swelling and stiffness  Anti-inflammatory medications per PCP and other specialists.  Follow up as needed     Date of encounter: 08/30/2023  Kiran Webb DO      Disclaimer: Please note that areas of this note were completed with computer voice recognition software.  Quite often unanticipated grammatical, syntax, homophones, and other interpretive errors are inadvertently transcribed by the computer software. Please excuse any errors that have escaped final proofreading.

## 2023-08-30 NOTE — TELEPHONE ENCOUNTER
Caller: Tae Henao    Relationship: Self    Best call back number: [phone]     What is the best time to reach you: ANYTIME    Who are you requesting to speak with (clinical staff, provider,  specific staff member): CLINICAL    What was the call regarding: PT HAS QUITE A FEW QUESTIONS THAT SHE IS NEEDING TO ASK. PLEASE CALL BACK TO DISCUSS.     Is it okay if the provider responds through [facility]: NO - PLEASE CALL BACK.

## 2023-08-30 NOTE — PROGRESS NOTES
"Procedure   - Large Joint Arthrocentesis: bilateral knee on 8/30/2023 1:19 PM  Indications: pain  Details: 18 G needle, ultrasound-guided posterior approach  Medications (Right): 2 mL lidocaine PF 1% 1 %  Aspirate (Right): 2 mL serous, yellow and blood-tinged  Medications (Left): 2 mL lidocaine PF 1% 1 %  Aspirate (Left): 23 mL yellow, serous and blood-tinged    Ultrasound-Guided Popliteal Cyst Aspiration/Injection Procedure Note    Bilateral popliteal cyst aspiration/injection was discussed with the patient in detail, including indication, risks, benefits, and alternatives. Verbal consent was given for the procedure. Injection was performed by physician.  The popliteal cyst was identified by ultrasound examination as well as adjacent neurovascular structures. Aspiration/injection site was marked then cleaned with Betadine and alcohol swabs.  Sterile technique was used. Local anesthesia was obtained with 2 mL 1% lidocaine without epinephrine  An 18-gauge, 1.5\" needle was directed to the cyst under continuous direct ultrasound visualization then used to aspirate above volume of clear, yellow fluid.   The needle was removed and a simple bandage was applied.  This procedure was repeated on the contralateral knee.  The procedure was tolerated well without difficulty.    Due to multiple lobulations and septations of synovium of right knee Baker's cyst minimal fluid was able to be aspirated.    Injection mixture for each knee:  1% lidocaine without epinephrine: 2 mL       Procedure, treatment alternatives, risks and benefits explained, specific risks discussed. Consent was given by the patient. Immediately prior to procedure a time out was called to verify the correct patient, procedure, equipment, support staff and site/side marked as required. Patient was prepped and draped in the usual sterile fashion.          "

## 2023-08-31 NOTE — TELEPHONE ENCOUNTER
Patient requested that Dr. Leon review her labs from  Rheumatology.  Rheumatology was called and records were requested. Pt had a few more questions for Dr. Leon which I stated I would speak to him about. She v/u and had no other questions.

## 2023-10-23 ENCOUNTER — TELEPHONE (OUTPATIENT)
Dept: ONCOLOGY | Facility: CLINIC | Age: 63
End: 2023-10-23

## 2023-10-23 NOTE — TELEPHONE ENCOUNTER
Caller: [facility]    Best call back number: [phone]    What is the best time to reach you: ANYTIME    Who are you requesting to speak with (clinical staff, provider,  specific staff member): CLINICAL     Do you know the name of the person who called: JONO    What was the call regarding: PLEASE FAX LAST OFFICE NOTE FROM 8/7/2023 -[phone].    Is it okay if the provider responds through [employer]:

## 2023-11-09 NOTE — PROGRESS NOTES
HEMATOLOGY ONCOLOGY OUTPATIENT FOLLOW UP      Patient name: Tae Henao  : 1960  MRN: [id number]  Primary Care Physician: Reilly Ruano APRN  Referring Physician: Reilly Ruano APRN  Reason For Consult:       History of Present Illness:  Patient is a 63 y.o. 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    Patient has had recurrent urinary tract infections since 2023.  Patient has had cystoscopy which was unremarkable  She also complains of rash along with flulike symptoms since Memorial Day.  Rash is predominantly on her legs and was on her face look like urticaria.  She has a photo of the rash which has now improved significantly.  She also been having pain in her chest back.  She was referred to dermatology with this rashes and her DONOVAN was positive  She was prescribed prednisone and the rash improved.  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  Kappa lambda ratio in the urine 0.56  PET scan with no evidence of malignancy    2023 - M protein 1.7    Subjective:    Patient has persistent knee pain, fatigue. No other new symptoms. Rash has resolved.      Past Medical History:   Diagnosis Date    Allergic     Arthritis     Depression     Headache     HTN (hypertension)     Joint pain     Osteopenia     Urinary tract infection         Past Surgical History:   Procedure Laterality Date     SECTION  1986    GASTRIC BYPASS           Current Outpatient Medications:     aspirin 81 MG EC tablet, Take 1 tablet by mouth Daily., Disp: , Rfl:     citalopram (CeleXA) 40 MG tablet, Daily., Disp: , Rfl:     ferrous sulfate 325 (65 FE) MG tablet, Take 1 tablet by mouth Daily., Disp: , Rfl: 1    gabapentin (NEURONTIN) 600 MG tablet, TAKE 1 TABLET BY MOUTH IN THE MORNING AT NOON AND AT BEDTIME, Disp: , Rfl:     hydroxychloroquine (PLAQUENIL) 200 MG tablet, Take 2 tablets by mouth Daily., Disp: , Rfl:     meloxicam (MOBIC) 15 MG tablet, Daily., Disp: , Rfl:     metoprolol tartrate (LOPRESSOR) 50 MG tablet, , Disp: , Rfl: 1    multivitamin with minerals tablet tablet, Take 1 tablet by mouth Daily., Disp: , Rfl:     Omeprazole 20 MG tablet delayed-release, Daily., Disp: , Rfl:     Probiotic Product (FORTIFY DAILY PROBIOTIC PO), Take  by mouth., Disp: , Rfl:     spironolactone (ALDACTONE) 25 MG tablet, Take 1 tablet by mouth Daily., Disp: , Rfl: 1    Vaginal Lubricant (REPLENS VA), Insert  into the vagina., Disp: , Rfl:     Allergies   Allergen Reactions    Morphine Unknown (See Comments)    Azithromycin Unknown (See Comments)    Penicillins Unknown (See Comments)       Family History   Problem Relation Age of Onset    Arthritis Mother     Diabetes Mother     Hypertension Mother     Hypertension Father     Heart disease Father     Arthritis Father     Cancer Sister     Stroke Sister        Cancer-related family history includes Cancer in her sister.      Social History     Tobacco Use    Smoking status: Former     Packs/day: 1.00     Years: 30.00     Additional pack years: 0.00     Total pack years: 30.00     Types: Cigarettes    Smokeless tobacco: Never    Tobacco comments:     Never smoked pregnant. Socially smoke for years   Substance Use Topics    Alcohol use: Yes     Comment: Socially not often    Drug use: No     Social History     Social  "History Narrative    Not on file           Objective:    Vital Signs:  Vitals:    11/13/23 1352   BP: 123/78   Pulse: 65   Resp: 16   Temp: 97.8 °F (36.6 °C)   SpO2: 98%   Weight: 77.4 kg (170 lb 9.6 oz)   Height: 162.6 cm (64\")   PainSc: 0-No pain       Body mass index is 29.28 kg/m².    ECOG  (0) Fully active, able to carry on all predisease performance without restriction    Physical Exam:   Physical Exam  Constitutional:       Appearance: Normal appearance.   HENT:      Head: Normocephalic and atraumatic.   Eyes:      Pupils: Pupils are equal, round, and reactive to light.   Cardiovascular:      Rate and Rhythm: Normal rate and regular rhythm.      Pulses: Normal pulses.      Heart sounds: No murmur heard.  Pulmonary:      Effort: Pulmonary effort is normal.      Breath sounds: Normal breath sounds.   Abdominal:      General: There is no distension.      Palpations: Abdomen is soft. There is no mass.      Tenderness: There is no abdominal tenderness.   Musculoskeletal:         General: Normal range of motion.      Cervical back: Normal range of motion and neck supple.   Skin:     General: Skin is warm.   Neurological:      General: No focal deficit present.      Mental Status: She is alert.   Psychiatric:         Mood and Affect: Mood normal.         Lab Results - Last 18 Months   Lab Units 11/13/23  1430 07/31/23  1241 07/28/23  1113   WBC 10*3/mm3 7.03 7.60 6.93   HEMOGLOBIN g/dL 9.8* 11.5* 11.2*   HEMATOCRIT % 32.1* 35.3 35.0   PLATELETS 10*3/mm3 322 283 235   MCV fL 102.9* 98.2* 101.7*     Lab Results - Last 18 Months   Lab Units 07/11/23  1439   SODIUM mmol/L 133*   POTASSIUM mmol/L 4.9   CHLORIDE mmol/L 100   CO2 mmol/L 26.0   BUN mg/dL 13   CREATININE mg/dL 0.78   CALCIUM mg/dL 9.9   BILIRUBIN mg/dL 0.3   ALK PHOS U/L 120*   ALT (SGPT) U/L 27   AST (SGOT) U/L 25   GLUCOSE mg/dL 83       Lab Results   Component Value Date    GLUCOSE 83 07/11/2023    BUN 13 07/11/2023    CREATININE 0.78 07/11/2023    " "EGFRIFNONA 64 07/11/2019    BCR 16.7 07/11/2023    K 4.9 07/11/2023    CO2 26.0 07/11/2023    CALCIUM 9.9 07/11/2023    PROTENTOTREF 8.3 07/11/2023    ALBUMIN 4.3 07/11/2023    ALBUMIN 3.8 07/11/2023    LABIL2 0.9 07/11/2023    AST 25 07/11/2023    ALT 27 07/11/2023       Lab Results - Last 18 Months   Lab Units 07/31/23  1241   INR  1.00   APTT seconds 28.8       Lab Results   Component Value Date    IRON 41 06/25/2018    TIBC 512 (H) 06/25/2018       No results found for: \"FOLATE\"    No results found for: \"OCCULTBLD\"    No results found for: \"RETICCTPCT\"  No results found for: \"XFOVXSJO03\"  No results found for: \"SPEP\", \"UPEP\"  No results found for: \"LDH\", \"URICACID\"  Lab Results   Component Value Date    SEDRATE 53 (H) 07/11/2019     Lab Results   Component Value Date    HAPTOGLOBIN 142 06/25/2018     Lab Results   Component Value Date    PTT 28.8 07/31/2023    INR 1.00 07/31/2023     No results found for: \"\"  No results found for: \"CEA\"  No components found for: \"CA-19-9\"  No results found for: \"PSA\"  Lab Results   Component Value Date    SEDRATE 53 (H) 07/11/2019          Assessment & Plan     Patient is a 63-year-old female with intermittent urticarial rash, arthralgias findings concerning for monoclonal gammopathy versus myeloma    Smoldering multiple myeloma  SPEP with IgG 2768, M spike at 2.0 IgG monoclonal protein with lambda specificity  Free light chain kappa 13.4, free light chain lambda at 199.9 with kappa lambda ratio of 0.07  Given such a significant elevation of monoclonal protein there is definitely underlying plasma cell dyscrasia  PET/CT with no bone lesions no other crab criteria satisfied  Bone marrow biopsy with 20% plasma cells  This is consistent with a diagnosis of smoldering multiple myeloma  I discussed that there has been studies with using Revlimid and other medications and smoldering myeloma however no overall survival benefit has been shown.  Currently I would recommend close " surveillance starting treatment once she satisfies myeloma defining events this way we can decrease toxicity associate with any treatment    Pending myeloma labs today, M protein stable 1.7  Repeat in 3 months and follow up      Thank you very much for providing the opportunity to participate in this patient’s care. Please do not hesitate to call if there are any other questions.

## 2023-11-13 ENCOUNTER — OFFICE VISIT (OUTPATIENT)
Dept: ONCOLOGY | Facility: CLINIC | Age: 63
End: 2023-11-13
Payer: COMMERCIAL

## 2023-11-13 ENCOUNTER — LAB (OUTPATIENT)
Dept: LAB | Facility: HOSPITAL | Age: 63
End: 2023-11-13
Payer: COMMERCIAL

## 2023-11-13 ENCOUNTER — APPOINTMENT (OUTPATIENT)
Dept: LAB | Facility: HOSPITAL | Age: 63
End: 2023-11-13
Payer: COMMERCIAL

## 2023-11-13 VITALS
BODY MASS INDEX: 29.12 KG/M2 | DIASTOLIC BLOOD PRESSURE: 78 MMHG | RESPIRATION RATE: 16 BRPM | WEIGHT: 170.6 LBS | OXYGEN SATURATION: 98 % | HEIGHT: 64 IN | HEART RATE: 65 BPM | TEMPERATURE: 97.8 F | SYSTOLIC BLOOD PRESSURE: 123 MMHG

## 2023-11-13 DIAGNOSIS — R76.8 ELEVATED ANTINUCLEAR ANTIBODY (ANA) LEVEL: ICD-10-CM

## 2023-11-13 DIAGNOSIS — D47.2 MGUS (MONOCLONAL GAMMOPATHY OF UNKNOWN SIGNIFICANCE): Primary | ICD-10-CM

## 2023-11-13 DIAGNOSIS — D47.2 MGUS (MONOCLONAL GAMMOPATHY OF UNKNOWN SIGNIFICANCE): ICD-10-CM

## 2023-11-13 DIAGNOSIS — C90.00 MULTIPLE MYELOMA NOT HAVING ACHIEVED REMISSION: ICD-10-CM

## 2023-11-13 LAB
ALBUMIN SERPL-MCNC: 3.9 G/DL (ref 3.5–5.2)
ALBUMIN/GLOB SERPL: 0.8 G/DL
ALP SERPL-CCNC: 85 U/L (ref 39–117)
ALT SERPL W P-5'-P-CCNC: 13 U/L (ref 1–33)
ANION GAP SERPL CALCULATED.3IONS-SCNC: 6 MMOL/L (ref 5–15)
AST SERPL-CCNC: 17 U/L (ref 1–32)
BASOPHILS # BLD AUTO: 0.03 10*3/MM3 (ref 0–0.2)
BASOPHILS NFR BLD AUTO: 0.4 % (ref 0–1.5)
BILIRUB SERPL-MCNC: 0.2 MG/DL (ref 0–1.2)
BUN SERPL-MCNC: 16 MG/DL (ref 8–23)
BUN/CREAT SERPL: 15.4 (ref 7–25)
CALCIUM SPEC-SCNC: 10.3 MG/DL (ref 8.6–10.5)
CHLORIDE SERPL-SCNC: 101 MMOL/L (ref 98–107)
CO2 SERPL-SCNC: 25 MMOL/L (ref 22–29)
CREAT SERPL-MCNC: 1.04 MG/DL (ref 0.57–1)
DEPRECATED RDW RBC AUTO: 53.1 FL (ref 37–54)
EGFRCR SERPLBLD CKD-EPI 2021: 60.1 ML/MIN/1.73
EOSINOPHIL # BLD AUTO: 0.14 10*3/MM3 (ref 0–0.4)
EOSINOPHIL NFR BLD AUTO: 2 % (ref 0.3–6.2)
ERYTHROCYTE [DISTWIDTH] IN BLOOD BY AUTOMATED COUNT: 14.5 % (ref 12.3–15.4)
GLOBULIN UR ELPH-MCNC: 5 GM/DL
GLUCOSE SERPL-MCNC: 113 MG/DL (ref 65–99)
HCT VFR BLD AUTO: 32.1 % (ref 34–46.6)
HGB BLD-MCNC: 9.8 G/DL (ref 12–15.9)
LYMPHOCYTES # BLD AUTO: 2.62 10*3/MM3 (ref 0.7–3.1)
LYMPHOCYTES NFR BLD AUTO: 37.3 % (ref 19.6–45.3)
MCH RBC QN AUTO: 31.4 PG (ref 26.6–33)
MCHC RBC AUTO-ENTMCNC: 30.5 G/DL (ref 31.5–35.7)
MCV RBC AUTO: 102.9 FL (ref 79–97)
MONOCYTES # BLD AUTO: 0.84 10*3/MM3 (ref 0.1–0.9)
MONOCYTES NFR BLD AUTO: 11.9 % (ref 5–12)
NEUTROPHILS NFR BLD AUTO: 3.4 10*3/MM3 (ref 1.7–7)
NEUTROPHILS NFR BLD AUTO: 48.4 % (ref 42.7–76)
PLATELET # BLD AUTO: 322 10*3/MM3 (ref 140–450)
PMV BLD AUTO: 8.3 FL (ref 6–12)
POTASSIUM SERPL-SCNC: 5.1 MMOL/L (ref 3.5–5.2)
PROT SERPL-MCNC: 8.9 G/DL (ref 6–8.5)
RBC # BLD AUTO: 3.12 10*6/MM3 (ref 3.77–5.28)
SODIUM SERPL-SCNC: 132 MMOL/L (ref 136–145)
WBC NRBC COR # BLD: 7.03 10*3/MM3 (ref 3.4–10.8)

## 2023-11-13 PROCEDURE — 99213 OFFICE O/P EST LOW 20 MIN: CPT | Performed by: INTERNAL MEDICINE

## 2023-11-13 PROCEDURE — 85025 COMPLETE CBC W/AUTO DIFF WBC: CPT

## 2023-11-13 PROCEDURE — 80053 COMPREHEN METABOLIC PANEL: CPT | Performed by: INTERNAL MEDICINE

## 2023-11-13 PROCEDURE — 36415 COLL VENOUS BLD VENIPUNCTURE: CPT

## 2023-11-13 RX ORDER — GABAPENTIN 600 MG/1
TABLET ORAL
COMMUNITY

## 2023-11-13 RX ORDER — HYDROXYCHLOROQUINE SULFATE 200 MG/1
2 TABLET, FILM COATED ORAL DAILY
COMMUNITY
Start: 2023-10-27

## 2023-11-14 LAB
ALBUMIN SERPL ELPH-MCNC: 3.5 G/DL (ref 2.9–4.4)
ALBUMIN/GLOB SERPL: 0.8 {RATIO} (ref 0.7–1.7)
ALPHA1 GLOB SERPL ELPH-MCNC: 0.4 G/DL (ref 0–0.4)
ALPHA2 GLOB SERPL ELPH-MCNC: 1.1 G/DL (ref 0.4–1)
B-GLOBULIN SERPL ELPH-MCNC: 0.9 G/DL (ref 0.7–1.3)
GAMMA GLOB SERPL ELPH-MCNC: 2.2 G/DL (ref 0.4–1.8)
GLOBULIN SER-MCNC: 4.6 G/DL (ref 2.2–3.9)
IGA SERPL-MCNC: 52 MG/DL (ref 87–352)
IGG SERPL-MCNC: 2829 MG/DL (ref 586–1602)
IGM SERPL-MCNC: 25 MG/DL (ref 26–217)
INTERPRETATION SERPL IEP-IMP: ABNORMAL
KAPPA LC FREE SER-MCNC: 22.4 MG/L (ref 3.3–19.4)
KAPPA LC FREE/LAMBDA FREE SER: 0.11 {RATIO} (ref 0.26–1.65)
LABORATORY COMMENT REPORT: ABNORMAL
LAMBDA LC FREE SERPL-MCNC: 200.7 MG/L (ref 5.7–26.3)
M PROTEIN SERPL ELPH-MCNC: 1.8 G/DL
PROT SERPL-MCNC: 8.1 G/DL (ref 6–8.5)

## 2024-01-03 ENCOUNTER — TELEPHONE (OUTPATIENT)
Dept: ONCOLOGY | Facility: CLINIC | Age: 64
End: 2024-01-03

## 2024-01-03 NOTE — TELEPHONE ENCOUNTER
The [facility] received a fax that requires your attention. The document has been indexed to the patient’s chart for your review.      Reason for sending: SPECIALTY CLEARANCE REQUEST    Documents Description: QUESTIONING  IF PATIENT IS SUITABLE FOR A RIGHT TOTAL KNEE REPLACEMENT SURGERY FROM A RHEUMATOLOGY STANDPOINT.     Name of Sender: ADRIEN KAHN MD OF [facility] FOR JOINT REPLACEMENT   PHONE [phone]  FAX       [phone]    Date Indexed: 1.3.24    Notes (if needed): INDEXED UNDER PHYSICIAN ORDER  1.3.24  THANK YOU

## 2024-01-17 ENCOUNTER — TELEPHONE (OUTPATIENT)
Dept: ONCOLOGY | Facility: CLINIC | Age: 64
End: 2024-01-17

## 2024-04-03 ENCOUNTER — TELEPHONE (OUTPATIENT)
Dept: ONCOLOGY | Facility: CLINIC | Age: 64
End: 2024-04-03

## 2024-04-03 NOTE — TELEPHONE ENCOUNTER
Caller: Tae Henao    Relationship: Self    Best call back number: [phone]    What was the call regarding: JUST AN FYI TO LET YOU KNOW PT CANCELLED HER   4/22 LAB & 4/29 F/U, AS SHE HAS TAKEN HER CARE TO .